# Patient Record
Sex: FEMALE | Race: WHITE
[De-identification: names, ages, dates, MRNs, and addresses within clinical notes are randomized per-mention and may not be internally consistent; named-entity substitution may affect disease eponyms.]

---

## 2019-09-24 ENCOUNTER — HOSPITAL ENCOUNTER (OUTPATIENT)
Dept: HOSPITAL 75 - RAD | Age: 69
End: 2019-09-24
Attending: NURSE PRACTITIONER
Payer: MEDICARE

## 2019-09-24 DIAGNOSIS — Z13.820: ICD-10-CM

## 2019-09-24 DIAGNOSIS — Z12.31: Primary | ICD-10-CM

## 2019-09-24 DIAGNOSIS — Z78.0: ICD-10-CM

## 2019-09-24 DIAGNOSIS — M85.89: ICD-10-CM

## 2019-09-24 PROCEDURE — 77080 DXA BONE DENSITY AXIAL: CPT

## 2019-09-24 PROCEDURE — 77067 SCR MAMMO BI INCL CAD: CPT

## 2019-09-24 NOTE — DIAGNOSTIC IMAGING REPORT
INDICATION: 

Routine screening.



COMPARISON: 

No prior mammograms are available for comparison.



TECHNIQUE: 

2D and 3D bilateral screening mammography was performed with CAD.



FINDINGS:

There are benign calcifications bilaterally. No dominant mass or

malignant appearing microcalcifications are seen. The axillae are

unremarkable.



IMPRESSION:   

No mammographic features suspicious for malignancy are

identified.



ACR BI-RADS Category 2: Benign findings.

Result letter will be mailed to the patient.

Note: At least 10% of breast cancer is not imaged by mammography.



Dictated by: 



  Dictated on workstation # [id number]

## 2019-09-24 NOTE — DIAGNOSTIC IMAGING REPORT
INDICATION: Asymptomatic postmenopausal screening



COMPARISON: Baseline



FINDINGS:



AP Spine L1-L4:  

[BMD (g/cm2): 1.055] [T-Score: -1.2] [Z-Score: -0.3]

[BMD Previous: N/A] [BMD % Change: N/A]



LT Hip Neck:       

[BMD (g/cm2): 0.850] [T-Score: -1.4] [Z-Score: -0.2]



LT Hip Total:       

[BMD (g/cm2):0.967] [T-Score:-0.3] [Z-Score: 0.6]

[BMD Previous: N/A] [BMD % Change: N/A]



RT Hip Neck:      

[BMD (g/cm2):1.000] [T-Score:-0.3] [Z-Score:0.9]



RT Hip Total:      

[BMD (g/cm2):1.060] [T-score:0.4] [Z-Score:1.3]

[BMD Previous:N/A] [BMD % Change:N/A]



*Indicates significant change from prior examination based on 95%

confidence level.



World Health Organization criteria for BMD interpretation

classify patients as Normal (T-score at or above -1.0),

Osteopenic (T-score between -1.0 and -2.5) or Osteoporotic

(T-score at or below -2.5).



LIMITATIONS AND MODIFICATION:  None.



FRACTURE RISK (FRAX SCORE):

The ten year probability of (%): 

Major Osteoporotic Fracture: [N/A]

Hip Fracture: [N/A]



IMPRESSION:

1. Osteopenia (Low bone mass).

2. Baseline examination.

3. See below National Osteoporosis Foundation guidelines on when

to potentially initiate pharmacologic therapy. 



Based on the National Osteoporosis Foundation Guidelines,

pharmacologic treatment should be initiated in any of the

following, unless clinical conditions suggest otherwise:



*  Any patient with prior fragility fracture of the hip or

vertebrae. A spine fracture indicates 5X risk for subsequent

spine fracture and 2X risk for subsequent hip fracture.



*  Osteoporosis (T-score <-2.5).



*  Postmenopausal women and men age 50 and older with low bone

mass/osteopenia (T-score between -1.0 and -2.5) by DXA and

10-year major osteoporotic fracture greater than 20% or a 10-year

probability of hip fracture greater than 3%. These fracture risks

are supplied above in the FRAX score, if applicable.



*  Clinician judgement and/or patient preferences may indicate

treatment for people with 10-year fracture probabilities above or

below these levels.



Dictated by: 



  Dictated on workstation # [id number]

## 2021-03-31 ENCOUNTER — HOSPITAL ENCOUNTER (OUTPATIENT)
Dept: HOSPITAL 75 - RAD | Age: 71
End: 2021-03-31
Attending: NURSE PRACTITIONER
Payer: MEDICARE

## 2021-03-31 DIAGNOSIS — Z12.31: Primary | ICD-10-CM

## 2021-03-31 PROCEDURE — 77063 BREAST TOMOSYNTHESIS BI: CPT

## 2021-03-31 PROCEDURE — 77067 SCR MAMMO BI INCL CAD: CPT

## 2021-04-01 NOTE — DIAGNOSTIC IMAGING REPORT
INDICATION: Routine screening.



Comparison is made with prior mammogram 09/24/2019.



2-D and 3-D bilateral screening mammography was performed with

CAD.



Scattered fibroglandular densities are identified bilaterally.

There are benign calcifications in both breasts. No mass or

malignant appearing microcalcifications are seen. Axillae are

unremarkable.



IMPRESSION: BI-RADS Category 2



No mammographic features suspicious for malignancy are

identified.



ACR BI-RADS Category 2: Benign findings.

Result letter will be mailed to the patient.

Note: At least 10% of breast cancer is not imaged by mammography.



Dictated by: 



  Dictated on workstation # [id number]

## 2021-04-27 ENCOUNTER — HOSPITAL ENCOUNTER (OUTPATIENT)
Dept: HOSPITAL 75 - PREOP | Age: 71
LOS: 1 days | Discharge: HOME | End: 2021-04-28
Attending: SURGERY
Payer: MEDICARE

## 2021-04-27 VITALS — BODY MASS INDEX: 39.06 KG/M2 | WEIGHT: 220.46 LBS | HEIGHT: 62.99 IN

## 2021-04-27 DIAGNOSIS — Z01.818: Primary | ICD-10-CM

## 2021-05-05 ENCOUNTER — HOSPITAL ENCOUNTER (OUTPATIENT)
Dept: HOSPITAL 75 - ENDO | Age: 71
Discharge: HOME | End: 2021-05-05
Attending: SURGERY
Payer: MEDICARE

## 2021-05-05 VITALS — SYSTOLIC BLOOD PRESSURE: 136 MMHG | DIASTOLIC BLOOD PRESSURE: 82 MMHG

## 2021-05-05 VITALS — DIASTOLIC BLOOD PRESSURE: 73 MMHG | SYSTOLIC BLOOD PRESSURE: 140 MMHG

## 2021-05-05 VITALS — SYSTOLIC BLOOD PRESSURE: 166 MMHG | DIASTOLIC BLOOD PRESSURE: 89 MMHG

## 2021-05-05 VITALS — SYSTOLIC BLOOD PRESSURE: 138 MMHG | DIASTOLIC BLOOD PRESSURE: 98 MMHG

## 2021-05-05 VITALS — BODY MASS INDEX: 39.06 KG/M2 | HEIGHT: 62.99 IN | WEIGHT: 220.46 LBS

## 2021-05-05 VITALS — DIASTOLIC BLOOD PRESSURE: 53 MMHG | SYSTOLIC BLOOD PRESSURE: 146 MMHG

## 2021-05-05 VITALS — DIASTOLIC BLOOD PRESSURE: 98 MMHG | SYSTOLIC BLOOD PRESSURE: 138 MMHG

## 2021-05-05 DIAGNOSIS — E78.5: ICD-10-CM

## 2021-05-05 DIAGNOSIS — K57.30: Primary | ICD-10-CM

## 2021-05-05 DIAGNOSIS — J45.909: ICD-10-CM

## 2021-05-05 DIAGNOSIS — Z86.010: ICD-10-CM

## 2021-05-05 DIAGNOSIS — Z82.49: ICD-10-CM

## 2021-05-05 DIAGNOSIS — Z79.899: ICD-10-CM

## 2021-05-05 DIAGNOSIS — Z79.82: ICD-10-CM

## 2021-05-05 DIAGNOSIS — Z79.02: ICD-10-CM

## 2021-05-05 DIAGNOSIS — Z90.710: ICD-10-CM

## 2021-05-05 DIAGNOSIS — Z88.2: ICD-10-CM

## 2021-05-05 DIAGNOSIS — E11.9: ICD-10-CM

## 2021-05-05 DIAGNOSIS — I10: ICD-10-CM

## 2021-05-05 DIAGNOSIS — K21.9: ICD-10-CM

## 2021-05-05 DIAGNOSIS — E66.9: ICD-10-CM

## 2021-05-05 DIAGNOSIS — Z88.0: ICD-10-CM

## 2021-05-05 DIAGNOSIS — Z83.3: ICD-10-CM

## 2021-05-05 PROCEDURE — 82947 ASSAY GLUCOSE BLOOD QUANT: CPT

## 2021-05-05 NOTE — ANESTHESIA-GENERAL POST-OP
MAC


Patient Condition


Mental Status/LOC:  Same as Preop


Cardiovascular:  Satisfactory


Nausea/Vomiting:  Absent


Respiratory:  Satisfactory


Pain:  Controlled


Complications:  Absent





Post Op Complications


Complications


None





Follow Up Care/Instructions


Patient Instructions


None needed.





Anesthesiology Discharge Order


Discharge Order


Patient is doing well, no complaints, stable vital signs, no apparent adverse 

anesthesia problems.   


No complications reported per nursing.











NATALIE OLIVA CRNA            May 5, 2021 08:25

## 2021-05-05 NOTE — PROGRESS NOTE-PRE OPERATIVE
Pre-Operative Progress Note


H&P Reviewed


The H&P was reviewed, patient examined and no changes noted.


Date Seen by Provider:  May 5, 2021


Time Seen by Provider:  07:36


Date H&P Reviewed:  May 5, 2021


Time H&P Reviewed:  07:36


Pre-Operative Diagnosis:  hx of polyps











ANNIKA SUNSHINE DO               May 5, 2021 07:36

## 2021-05-06 NOTE — OPERATIVE REPORT
DATE OF SERVICE:  05/05/2021



PREOPERATIVE DIAGNOSES:

Screening colonoscopy, history of polyps.



POSTOPERATIVE DIAGNOSIS:

Diverticulosis.



PROCEDURE:

Colonoscopy.



SURGEON:

Annika Arango DO



ANESTHESIA:

Per CRNA.



ESTIMATED BLOOD LOSS:

Minimal.



COMPLICATIONS:

None.



INDICATIONS:

The patient is a 71-year-old female with history of polyps.  She understands

risks and benefits of procedure and wished to proceed with procedure.  Consent

was signed in the chart.



DESCRIPTION OF PROCEDURE:

The patient was taken to the endoscopy suite, placed in left lateral recumbent

position.  Timeout was performed.  Digital rectal exam was performed.  No

palpable polyps, masses or ulcerations.  Scope was inserted in the rectum,

advanced all the way to cecum with minimal difficulty.  Prep was adequate. 

Scope was then slowly retracted back.  There were no polyps, masses or

ulcerations within the cecum, ascending, transverse, descending and sigmoid

colon.  Through the sigmoid colon, a moderate amount of diverticulosis was

present.  Scope was then slowly retracted back to the rectum where it was also

retroflexed.  No other pathology noted.  Scope was returned to its normal

position, slowly withdrawn until completely removed.  The patient tolerated

procedure well without any complications.  She was taken to recovery room in

stable condition.



RECOMMENDATIONS:

The patient recommended high-fiber diet.  We would recommend repeat colonoscopy

in 5 years due to history of polyps, though she does not wish to do this due to

age.  We would repeat if has any change in bowels or any new symptoms.





Job ID: [id number]

DocumentID: [id number]

Dictated Date:  05/05/2021 22:15:29

Transcription Date: 05/06/2021 02:43:23

Dictated By: ANNIKA ARANGO DO

## 2022-02-25 ENCOUNTER — HOSPITAL ENCOUNTER (OUTPATIENT)
Dept: HOSPITAL 75 - ER | Age: 72
Setting detail: OBSERVATION
LOS: 3 days | Discharge: HOME | End: 2022-02-28
Attending: FAMILY MEDICINE | Admitting: INTERNAL MEDICINE
Payer: MEDICARE

## 2022-02-25 VITALS — BODY MASS INDEX: 31.76 KG/M2 | HEIGHT: 62.99 IN | WEIGHT: 179.24 LBS

## 2022-02-25 VITALS — DIASTOLIC BLOOD PRESSURE: 60 MMHG | SYSTOLIC BLOOD PRESSURE: 134 MMHG

## 2022-02-25 VITALS — DIASTOLIC BLOOD PRESSURE: 67 MMHG | SYSTOLIC BLOOD PRESSURE: 119 MMHG

## 2022-02-25 VITALS — DIASTOLIC BLOOD PRESSURE: 76 MMHG | SYSTOLIC BLOOD PRESSURE: 121 MMHG

## 2022-02-25 DIAGNOSIS — E11.9: ICD-10-CM

## 2022-02-25 DIAGNOSIS — I95.9: ICD-10-CM

## 2022-02-25 DIAGNOSIS — Z79.899: ICD-10-CM

## 2022-02-25 DIAGNOSIS — G89.29: ICD-10-CM

## 2022-02-25 DIAGNOSIS — D62: ICD-10-CM

## 2022-02-25 DIAGNOSIS — K57.31: Primary | ICD-10-CM

## 2022-02-25 DIAGNOSIS — M54.9: ICD-10-CM

## 2022-02-25 DIAGNOSIS — K52.9: ICD-10-CM

## 2022-02-25 DIAGNOSIS — J45.909: ICD-10-CM

## 2022-02-25 DIAGNOSIS — M25.569: ICD-10-CM

## 2022-02-25 DIAGNOSIS — Z79.82: ICD-10-CM

## 2022-02-25 DIAGNOSIS — M19.90: ICD-10-CM

## 2022-02-25 DIAGNOSIS — Z79.4: ICD-10-CM

## 2022-02-25 LAB
ALBUMIN SERPL-MCNC: 3.7 GM/DL (ref 3.2–4.5)
ALP SERPL-CCNC: 84 U/L (ref 40–136)
ALT SERPL-CCNC: 13 U/L (ref 0–55)
APTT BLD: 24 SEC (ref 24–35)
BASOPHILS # BLD AUTO: 0 10^3/UL (ref 0–0.1)
BASOPHILS NFR BLD AUTO: 0 % (ref 0–10)
BILIRUB SERPL-MCNC: 0.2 MG/DL (ref 0.1–1)
BUN/CREAT SERPL: 25
CALCIUM SERPL-MCNC: 8.9 MG/DL (ref 8.5–10.1)
CHLORIDE SERPL-SCNC: 103 MMOL/L (ref 98–107)
CO2 SERPL-SCNC: 20 MMOL/L (ref 21–32)
CREAT SERPL-MCNC: 0.72 MG/DL (ref 0.6–1.3)
EOSINOPHIL # BLD AUTO: 0.1 10^3/UL (ref 0–0.3)
EOSINOPHIL NFR BLD AUTO: 1 % (ref 0–10)
GFR SERPLBLD BASED ON 1.73 SQ M-ARVRAT: 89 ML/MIN
GLUCOSE SERPL-MCNC: 187 MG/DL (ref 70–105)
HCT VFR BLD CALC: 26 % (ref 35–52)
HCT VFR BLD CALC: 33 % (ref 35–52)
HGB BLD-MCNC: 10.3 G/DL (ref 11.5–16)
HGB BLD-MCNC: 8.2 G/DL (ref 11.5–16)
INR PPP: 0.9 (ref 0.8–1.4)
LYMPHOCYTES # BLD AUTO: 2.9 X 10^3 (ref 1–4)
LYMPHOCYTES NFR BLD AUTO: 26 % (ref 12–44)
MANUAL DIFFERENTIAL PERFORMED BLD QL: NO
MCH RBC QN AUTO: 27 PG (ref 25–34)
MCHC RBC AUTO-ENTMCNC: 32 G/DL (ref 32–36)
MCV RBC AUTO: 84 FL (ref 80–99)
MONOCYTES # BLD AUTO: 0.6 X 10^3 (ref 0–1)
MONOCYTES NFR BLD AUTO: 6 % (ref 0–12)
NEUTROPHILS # BLD AUTO: 7.7 X 10^3 (ref 1.8–7.8)
NEUTROPHILS NFR BLD AUTO: 67 % (ref 42–75)
PLATELET # BLD: 278 10^3/UL (ref 130–400)
PMV BLD AUTO: 10.3 FL (ref 9–12.2)
POTASSIUM SERPL-SCNC: 4.5 MMOL/L (ref 3.6–5)
PROT SERPL-MCNC: 6.6 GM/DL (ref 6.4–8.2)
PROTHROMBIN TIME: 13 SEC (ref 12.2–14.7)
SODIUM SERPL-SCNC: 137 MMOL/L (ref 135–145)
WBC # BLD AUTO: 11.5 10^3/UL (ref 4.3–11)

## 2022-02-25 PROCEDURE — 85730 THROMBOPLASTIN TIME PARTIAL: CPT

## 2022-02-25 PROCEDURE — 74177 CT ABD & PELVIS W/CONTRAST: CPT

## 2022-02-25 PROCEDURE — 86920 COMPATIBILITY TEST SPIN: CPT

## 2022-02-25 PROCEDURE — 86900 BLOOD TYPING SEROLOGIC ABO: CPT

## 2022-02-25 PROCEDURE — 83540 ASSAY OF IRON: CPT

## 2022-02-25 PROCEDURE — 80053 COMPREHEN METABOLIC PANEL: CPT

## 2022-02-25 PROCEDURE — 82947 ASSAY GLUCOSE BLOOD QUANT: CPT

## 2022-02-25 PROCEDURE — 86901 BLOOD TYPING SEROLOGIC RH(D): CPT

## 2022-02-25 PROCEDURE — 86850 RBC ANTIBODY SCREEN: CPT

## 2022-02-25 PROCEDURE — 85014 HEMATOCRIT: CPT

## 2022-02-25 PROCEDURE — 36415 COLL VENOUS BLD VENIPUNCTURE: CPT

## 2022-02-25 PROCEDURE — 85610 PROTHROMBIN TIME: CPT

## 2022-02-25 PROCEDURE — 85025 COMPLETE CBC W/AUTO DIFF WBC: CPT

## 2022-02-25 PROCEDURE — 85018 HEMOGLOBIN: CPT

## 2022-02-25 RX ADMIN — SODIUM CHLORIDE SCH MLS/HR: 900 INJECTION, SOLUTION INTRAVENOUS at 20:16

## 2022-02-25 RX ADMIN — INSULIN ASPART SCH UNIT: 100 INJECTION, SOLUTION INTRAVENOUS; SUBCUTANEOUS at 20:16

## 2022-02-25 RX ADMIN — CIPROFLOXACIN SCH MLS/HR: 2 INJECTION, SOLUTION INTRAVENOUS at 21:51

## 2022-02-25 RX ADMIN — DOCUSATE SODIUM SCH MG: 100 CAPSULE ORAL at 19:54

## 2022-02-25 RX ADMIN — METRONIDAZOLE SCH MLS/HR: 5 INJECTION, SOLUTION INTRAVENOUS at 23:11

## 2022-02-25 RX ADMIN — SENNOSIDES SCH MG: 8.6 TABLET, FILM COATED ORAL at 19:54

## 2022-02-25 NOTE — DIAGNOSTIC IMAGING REPORT
PROCEDURE: CT abdomen and pelvis with contrast.



TECHNIQUE: Multiple contiguous axial images were obtained through

the abdomen and pelvis after administration of intravenous

contrast. Auto Exposure Controls were utilized during the CT exam

to meet ALARA standards for radiation dose reduction. All CT

scans use one or more of the following dose optimizing

techniques: automated exposure control, MA and/or KvP adjustment

based on patient size and exam type or iterative reconstruction.



INDICATION: Left lower quadrant pain. GI bleed. 



COMPARISON: None.



FINDINGS: Lung bases are clear. Nonspecific low-attenuation

regions in the liver likely represent benign cysts or

hemangiomas. Some of these are too small to characterize. The

gallbladder, pancreas, spleen, adrenals, kidneys, collecting

systems, bladder and appendix are negative. There is some

possible wall thickening posteriorly near the rectosigmoid

junction, best seen on axial image 136. Moderate colonic

diverticulosis. No free intraperitoneal air or fluid. No

lymphadenopathy. No evidence of bowel obstruction.

Age-appropriate changes in the spine. No acute osseous findings.



IMPRESSION: 

1. Possible region of bowel wall thickening near the rectosigmoid

junction. This could be better evaluated with direct

visualization/endoscopy.

2. Moderate colonic diverticulosis. No evidence of active

diverticulitis.

3. Several low-attenuation regions scattered throughout the liver

most likely represent benign cysts or hemangiomas but several are

too small to characterize and no priors are available for

comparison.



Dictated by: 



  Dictated on workstation # [id number]

## 2022-02-25 NOTE — ED GI
General


Chief Complaint:  Rect Problems


Stated Complaint:  RECTAL BLEEDING


Nursing Triage Note:  


PT TO RM 7 VIA [facility] W REPORTS OF BLOODY STOOLS. PT ALSO C/O 


NAUSEA AND DIARRHEA, SYMPTOMS BEGAN THIS AM. PT A&OX4, DENIES PAIN.


Source of Information:  Patient


Exam Limitations:  No Limitations





History of Present Illness


Date Seen by Provider:  2022


Time Seen by Provider:  16:19


Initial Comments


To ER by EMS from home with reports of GI bleed.  She has had some bloody stools

"at least 10" today starting at 10 AM.  As the day progressed she has had inc

reasing weakness and lightheadedness.  Her only anticoagulant medication is 

aspirin, no other antiplatelet or anticoagulant medications.  She has some mild 

left lower quadrant abdominal pain and a known history of diverticulosis.  No 

fevers or chills.  She does have some nausea.  She has had a couple of these 

episodes in the past but has never had them evaluated because the bleeding 

seemed to stop on its own.  Most recently she had an episode about 1 year ago.  

She follows with [facility].


Timing/Duration:  4-6 Hours


Severity/Quality:  Moderate


Location:  Generalized Abdomen


Radiation:  No Radiation


Activities at Onset:  None


Associated Symptoms:  Nausea/Vomiting





Allergies and Home Medications


Allergies


Coded Allergies:  


     Penicillins (Unverified  Allergy, Unknown, 12/29/15)


     Sulfa (Sulfonamide Antibiotics) (Unverified  Allergy, Unknown, 12/29/15)





Patient Home Medication List


Home Medication List Reviewed:  Yes


Albuterol Sulfate (Proair Hfa) 8.5 Gm Hfa.aer.ad, 8.5 GM IH AS NEEDED, 

(Reported)


   Entered as Reported by: SELENA ROMO on 10/20/15 1242


Aspirin (Aspir 81) 81 Mg Tablet.dr, 81 MG PO DAILY, (Reported)


   Entered as Reported by: SELENA ROMO on 10/20/15 1242


Buspirone HCl (Buspirone HCl) 10 Mg Tablet, 10 MG PO TID, (Reported)


   Entered as Reported by: SELENA ROMO on 10/20/15 1242


Calcium Carbonate/Vitamin D3 (Calcium 600 + D Tablet) 1 Each Tablet, 2 EACH PO 

DAILY, (Reported)


   Entered as Reported by: SELENA ROMO on 10/20/15 1242


Cranberry Extract (Cranberry) 200 Mg Capsule, 84 MG PO DAILY, (Reported)


   Entered as Reported by: SELENA ROMO on 10/20/15 1242


Fexofenadine HCl (Allegra Allergy) 180 Mg Tablet, 180 MG PO PRN, (Reported)


   Entered as Reported by: SELENA ROMO on 10/20/15 124


Lisinopril (Lisinopril) 5 Mg Tablet, 5 MG PO DAILY, (Reported)


   Entered as Reported by: SELENA ROMO on 10/20/15 124


Magnesium Oxide (Magnesium) 400 Mg Capsule, 400 MG PO BID, (Reported)


   Entered as Reported by: SELENA ROMO on 10/20/15 1248


Omega3/Dha/Epa/Fish Oil/Vit D3 (Fish Oil + Vitamin D-3 Softgel) 1 Each Capsule, 

1 EACH PO DAILY, (Reported)


   Entered as Reported by: SELENA ROMO on 10/20/15 124


Pantoprazole Sodium (Protonix) 40 Mg Granpkt.dr, 40 MG PO BID


   Prescribed by: ANNIKA SUNSHINE on 12/29/15 1114


Potassium Gluconate (Potassium Gluconate) 500 Mg Tablet, 500 MG PO DAILY, 

(Reported)


   Entered as Reported by: SELENA ROMO on 10/20/15 1242


Simvastatin (Simvastatin) 20 Mg Tablet, 20 MG PO DAILY, (Reported)


   Entered as Reported by: SELENA ROMO on 10/20/15 124


Venlafaxine HCl (Effexor Xr) 150 Mg Cap.er.24h, 150 MG PO DAILY, (Reported)


   Entered as Reported by: SELENA ROMO on 10/20/15 124


[Mucinex]  , 2 AS NEEDED, (Reported)


   Entered as Reported by: SELENA ROMO on 10/20/15 1242





Review of Systems


Review of Systems


Constitutional:  see HPI


EENTM:  No Symptoms Reported


Respiratory:  No Symptoms Reported


Cardiovascular:  No Symptoms Reported


Gastrointestinal:  See HPI, Abdominal Pain, Rectal Bleeding


Genitourinary:  No Symptoms Reported


Musculoskeletal:  no symptoms reported


Skin:  no symptoms reported


Psychiatric/Neurological:  No Symptoms Reported


Endocrine:  No Symptoms Reported


Hematologic/Lymphatic:  No Symptoms Reported





Past Medical-Social-Family Hx


Patient Social History


Tobacco Use?:  No


Use of E-Cig and/or Vaping dev:  No


Substance use?:  No


Alcohol Use?:  No





Immunizations Up To Date


Tetanus Booster (TDap):  Unknown


Influenza Vaccine Up-to-Date:  Yes; Up-to-Date


First/Initial COVID19 Vaccinat:  none


Second COVID19 Vaccination Paco:  none


Third COVID19 Vaccination Date:  none


COVID19 Vaccine :  none





Seasonal Allergies


Seasonal Allergies:  No





Past Medical History


Surgery/Hospitalization HX:  


HTN, T2DM


Surgeries:  Yes


Hysterectomy


Respiratory:  Yes (ASTHMA)


Cardiac:  No


Neurological:  No


Genitourinary:  No


Gastrointestinal:  No


Musculoskeletal:  Yes (ARTHRITIS)


Endocrine:  Yes


Diabetes, Non-Insulin dep


HEENT:  No


Loss of Vision:  Bilateral


Hearing Impairment:  Denies


Cancer:  No


Psychosocial:  No


Blood Disorders:  No


Adverse Reaction/Blood Tranf:  No





Physical Exam


Vital Signs





Vital Signs - First Documented








 22





 16:10


 


Temp 35.8


 


Pulse 92


 


Resp 20


 


B/P (MAP) 125/73 (90)


 


Pulse Ox 95


 


O2 Delivery Room Air





Capillary Refill : Less Than 3 Seconds


Height/Weight/BMI


Height: 5'4.00"


Weight: 219lbs. oz. 99.111799wz; 32.00 BMI


Method:


General Appearance:  WD/WN, no apparent distress, other (Alert and oriented 

hemodynamically stable heart rate in the 90s blood pressure of 129/72.)


HEENT:  PERRL/EOMI, normal ENT inspection


Neck:  non-tender, full range of motion


Respiratory:  no respiratory distress, no accessory muscle use


Cardiovascular:  regular rate, rhythm, no murmur


Gastrointestinal:  normal bowel sounds, soft, tenderness


Rectal:  other (  Okay with her back she is right no active bleeding at this 

time though there is some dried blood around the anus.)


Extremities:  normal range of motion, non-tender


Neurologic/Psychiatric:  alert, normal mood/affect, oriented x 3


Skin:  normal color, warm/dry





Progress/Results/Core Measures


Results/Orders


Lab Results





Laboratory Tests








Test


 22


16:15 Range/Units


 


 


White Blood Count


 11.5 H


 4.3-11.0


10^3/uL


 


Red Blood Count


 3.88 


 3.80-5.11


10^6/uL


 


Hemoglobin 10.3 L 11.5-16.0  g/dL


 


Hematocrit 33 L 35-52  %


 


Mean Corpuscular Volume 84  80-99  fL


 


Mean Corpuscular Hemoglobin 27  25-34  pg


 


Mean Corpuscular Hemoglobin


Concent 32 


 32-36  g/dL





 


Red Cell Distribution Width 17.4 H 10.0-14.5  %


 


Platelet Count


 278 


 130-400


10^3/uL


 


Mean Platelet Volume 10.3  9.0-12.2  fL


 


Immature Granulocyte % (Auto) 1   %


 


Neutrophils (%) (Auto) 67  42-75  %


 


Lymphocytes (%) (Auto) 26  12-44  %


 


Monocytes (%) (Auto) 6  0-12  %


 


Eosinophils (%) (Auto) 1  0-10  %


 


Basophils (%) (Auto) 0  0-10  %


 


Neutrophils # (Auto) 7.7  1.8-7.8  X 10^3


 


Lymphocytes # (Auto) 2.9  1.0-4.0  X 10^3


 


Monocytes # (Auto) 0.6  0.0-1.0  X 10^3


 


Eosinophils # (Auto)


 0.1 


 0.0-0.3


10^3/uL


 


Basophils # (Auto)


 0.0 


 0.0-0.1


10^3/uL


 


Immature Granulocyte # (Auto)


 0.1 


 0.0-0.1


10^3/uL


 


Prothrombin Time 13.0  12.2-14.7  SEC


 


INR Comment 0.9  0.8-1.4  


 


Activated Partial


Thromboplast Time 24 


 24-35  SEC





 


Sodium Level 137  135-145  MMOL/L


 


Potassium Level 4.5  3.6-5.0  MMOL/L


 


Chloride Level 103    MMOL/L


 


Carbon Dioxide Level 20 L 21-32  MMOL/L


 


Anion Gap 14  5-14  MMOL/L


 


Blood Urea Nitrogen 18  7-18  MG/DL


 


Creatinine


 0.72 


 0.60-1.30


MG/DL


 


Estimat Glomerular Filtration


Rate 89 


  





 


BUN/Creatinine Ratio 25   


 


Glucose Level 187 H   MG/DL


 


Calcium Level 8.9  8.5-10.1  MG/DL


 


Corrected Calcium 9.1  8.5-10.1  MG/DL


 


Total Bilirubin 0.2  0.1-1.0  MG/DL


 


Aspartate Amino Transf


(AST/SGOT) 15 


 5-34  U/L





 


Alanine Aminotransferase


(ALT/SGPT) 13 


 0-55  U/L





 


Alkaline Phosphatase 84    U/L


 


Total Protein 6.6  6.4-8.2  GM/DL


 


Albumin 3.7  3.2-4.5  GM/DL








My Orders





Orders - ELEAZAR ASHBY APRN


Cbc With Automated Diff (22 16:14)


Comprehensive Metabolic Panel (22 16:14)


Partial Thromboplastin Time (22 16:14)


Protime With Inr (22 16:14)


Type And Screen (22 16:14)


Red Cells Leukocytes Reduced (22 16:14)


Ed Iv/Invasive Line Start (22 16:14)


Ct Abdomen/Pelvis W (22 16:14)


Iohexol Injection (Omnipaque 350 Mg/Ml 1 (22 17:00)


Received Contrast (Hold Metformin- Contr (22 17:00)


Ns (Ivpb) (Sodium Chloride 0.9% Ivpb Bag (22 17:00)


Lactated Ringers (Lr 1000 Ml Iv Solution (22 17:45)





Medications Given in ED





Current Medications








 Medications  Dose


 Ordered  Sig/Sabrina


 Route  Start Time


 Stop Time Status Last Admin


Dose Admin


 


 Iohexol  100 ml  ONCE  ONCE


 IV  22 17:00


 22 17:01 DC 22 17:10


100 ML


 


 Sodium Chloride  100 ml  ONCE  ONCE


 IV  22 17:00


 22 17:01 DC 22 17:11


80 ML








Vital Signs/I&O











 22





 16:10


 


Temp 35.8


 


Pulse 92


 


Resp 20


 


B/P (MAP) 125/73 (90)


 


Pulse Ox 95


 


O2 Delivery Room Air














Blood Pressure Mean:                    90











Departure


Communication (Admissions)


0829-she lives at home in an apartment complex in [place] alone with her dog.  

She has not had any bloody stools here.  She is hemodynamically stable with a 

heart rate of 98 blood pressure 123/77.  She is alert and oriented.  Given her 

age and potential for rebleeding I think it would be best to observe her 

overnight with a repeat hemoglobin in the morning.  We will do Cipro and Flagyl 

empirically.  I will admit to Dr. Gtz consult Dr. Sunshine.


NAME:   ABIGAIL HOPKINS


MED REC#:   [id number]


ACCOUNT#:   [id number]


PT STATUS:   REG ER


:   1950


PHYSICIAN:   ELEAZAR ASHBY


ADMIT DATE:   22/ER


                                   ***Draft***


Date of Exam:22





CT ABDOMEN/PELVIS W








PROCEDURE: CT abdomen and pelvis with contrast.





TECHNIQUE: Multiple contiguous axial images were obtained through


the abdomen and pelvis after administration of intravenous


contrast. Auto Exposure Controls were utilized during the CT exam


to meet ALARA standards for radiation dose reduction. All CT


scans use one or more of the following dose optimizing


techniques: automated exposure control, MA and/or KvP adjustment


based on patient size and exam type or iterative reconstruction.





INDICATION: Left lower quadrant pain. GI bleed. 





COMPARISON: None.





FINDINGS: Lung bases are clear. Nonspecific low-attenuation


regions in the liver likely represent benign cysts or


hemangiomas. Some of these are too small to characterize. The


gallbladder, pancreas, spleen, adrenals, kidneys, collecting


systems, bladder and appendix are negative. There is some


possible wall thickening posteriorly near the rectosigmoid


junction, best seen on axial image 136. Moderate colonic


diverticulosis. No free intraperitoneal air or fluid. No


lymphadenopathy. No evidence of bowel obstruction.


Age-appropriate changes in the spine. No acute osseous findings.





IMPRESSION: 


1. Possible region of bowel wall thickening near the rectosigmoid


junction. This could be better evaluated with direct


visualization/endoscopy.


2. Moderate colonic diverticulosis. No evidence of active


diverticulitis.


3. Several low-attenuation regions scattered throughout the liver


most likely represent benign cysts or hemangiomas but several are


too small to characterize and no priors are available for


comparison.





  Dictated on workstation # [id number]








Dict:   [id number]


Trans:   [id number]


[facility] [id number]





Interpreted by:     ADELA OREILLY MD


Electronically signed by:





Impression





   Primary Impression:  


   GI bleed


Disposition:   ADMITTED AS INPATIENT


Condition:  Stable





Admissions


Decision to Admit Reason:  Admit from ER (General)


Decision to Admit/Date:  2022


Time/Decision to Admit Time:  17:56





Departure-Patient Inst.


Referrals:  


[facility] (PCP/Family)


Primary Care Physician











ELEAZAR ASHBY             2022 16:21

## 2022-02-26 VITALS — DIASTOLIC BLOOD PRESSURE: 57 MMHG | SYSTOLIC BLOOD PRESSURE: 107 MMHG

## 2022-02-26 VITALS — SYSTOLIC BLOOD PRESSURE: 139 MMHG | DIASTOLIC BLOOD PRESSURE: 65 MMHG

## 2022-02-26 VITALS — SYSTOLIC BLOOD PRESSURE: 130 MMHG | DIASTOLIC BLOOD PRESSURE: 75 MMHG

## 2022-02-26 VITALS — SYSTOLIC BLOOD PRESSURE: 142 MMHG | DIASTOLIC BLOOD PRESSURE: 67 MMHG

## 2022-02-26 VITALS — DIASTOLIC BLOOD PRESSURE: 67 MMHG | SYSTOLIC BLOOD PRESSURE: 121 MMHG

## 2022-02-26 VITALS — SYSTOLIC BLOOD PRESSURE: 134 MMHG | DIASTOLIC BLOOD PRESSURE: 67 MMHG

## 2022-02-26 LAB
ALBUMIN SERPL-MCNC: 3.2 GM/DL (ref 3.2–4.5)
ALP SERPL-CCNC: 67 U/L (ref 40–136)
ALT SERPL-CCNC: 11 U/L (ref 0–55)
BASOPHILS # BLD AUTO: 0 10^3/UL (ref 0–0.1)
BASOPHILS NFR BLD AUTO: 1 % (ref 0–10)
BILIRUB SERPL-MCNC: 0.2 MG/DL (ref 0.1–1)
BUN/CREAT SERPL: 22
CALCIUM SERPL-MCNC: 8.1 MG/DL (ref 8.5–10.1)
CHLORIDE SERPL-SCNC: 107 MMOL/L (ref 98–107)
CO2 SERPL-SCNC: 22 MMOL/L (ref 21–32)
CREAT SERPL-MCNC: 0.58 MG/DL (ref 0.6–1.3)
EOSINOPHIL # BLD AUTO: 0.1 10^3/UL (ref 0–0.3)
EOSINOPHIL NFR BLD AUTO: 2 % (ref 0–10)
GFR SERPLBLD BASED ON 1.73 SQ M-ARVRAT: 97 ML/MIN
GLUCOSE SERPL-MCNC: 119 MG/DL (ref 70–105)
HCT VFR BLD CALC: 26 % (ref 35–52)
HGB BLD-MCNC: 8.3 G/DL (ref 11.5–16)
LYMPHOCYTES # BLD AUTO: 2.3 10^3/UL (ref 1–4)
LYMPHOCYTES NFR BLD AUTO: 35 % (ref 12–44)
MANUAL DIFFERENTIAL PERFORMED BLD QL: NO
MCH RBC QN AUTO: 26 PG (ref 25–34)
MCHC RBC AUTO-ENTMCNC: 31 G/DL (ref 32–36)
MCV RBC AUTO: 83 FL (ref 80–99)
MONOCYTES # BLD AUTO: 0.6 10^3/UL (ref 0–1)
MONOCYTES NFR BLD AUTO: 9 % (ref 0–12)
NEUTROPHILS # BLD AUTO: 3.4 10^3/UL (ref 1.8–7.8)
NEUTROPHILS NFR BLD AUTO: 53 % (ref 42–75)
PLATELET # BLD: 214 10^3/UL (ref 130–400)
PMV BLD AUTO: 9.8 FL (ref 9–12.2)
POTASSIUM SERPL-SCNC: 4.1 MMOL/L (ref 3.6–5)
PROT SERPL-MCNC: 5.5 GM/DL (ref 6.4–8.2)
SODIUM SERPL-SCNC: 137 MMOL/L (ref 135–145)
WBC # BLD AUTO: 6.4 10^3/UL (ref 4.3–11)

## 2022-02-26 RX ADMIN — SODIUM CHLORIDE SCH MLS/HR: 900 INJECTION, SOLUTION INTRAVENOUS at 16:22

## 2022-02-26 RX ADMIN — PANTOPRAZOLE SODIUM SCH MG: 40 TABLET, DELAYED RELEASE ORAL at 08:07

## 2022-02-26 RX ADMIN — INSULIN ASPART SCH UNIT: 100 INJECTION, SOLUTION INTRAVENOUS; SUBCUTANEOUS at 20:19

## 2022-02-26 RX ADMIN — METRONIDAZOLE SCH MLS/HR: 5 INJECTION, SOLUTION INTRAVENOUS at 21:14

## 2022-02-26 RX ADMIN — SODIUM CHLORIDE SCH MLS/HR: 900 INJECTION, SOLUTION INTRAVENOUS at 08:07

## 2022-02-26 RX ADMIN — METRONIDAZOLE SCH MLS/HR: 5 INJECTION, SOLUTION INTRAVENOUS at 10:05

## 2022-02-26 RX ADMIN — DOCUSATE SODIUM SCH MG: 100 CAPSULE ORAL at 08:07

## 2022-02-26 RX ADMIN — INSULIN ASPART SCH UNIT: 100 INJECTION, SOLUTION INTRAVENOUS; SUBCUTANEOUS at 10:27

## 2022-02-26 RX ADMIN — CIPROFLOXACIN SCH MLS/HR: 2 INJECTION, SOLUTION INTRAVENOUS at 19:44

## 2022-02-26 RX ADMIN — SENNOSIDES SCH MG: 8.6 TABLET, FILM COATED ORAL at 08:07

## 2022-02-26 RX ADMIN — CIPROFLOXACIN SCH MLS/HR: 2 INJECTION, SOLUTION INTRAVENOUS at 08:07

## 2022-02-26 RX ADMIN — INSULIN ASPART SCH UNIT: 100 INJECTION, SOLUTION INTRAVENOUS; SUBCUTANEOUS at 16:22

## 2022-02-26 RX ADMIN — SENNOSIDES SCH MG: 8.6 TABLET, FILM COATED ORAL at 19:44

## 2022-02-26 RX ADMIN — INSULIN ASPART SCH UNIT: 100 INJECTION, SOLUTION INTRAVENOUS; SUBCUTANEOUS at 04:57

## 2022-02-26 RX ADMIN — DOCUSATE SODIUM SCH MG: 100 CAPSULE ORAL at 19:43

## 2022-02-26 NOTE — HISTORY & PHYSICAL-HOSPITALIST
History of Present Illness


HPI/Chief Complaint


Chief complaint: Hematochezia





History of present illness: This is a 71-year-old white female clinic patient of

[facility] who presented to the ER after feeling dizzy and passing bright red blood per

rectum.  Patient was assessed to have hemoglobin of 10 and CT scan showed 

colitis.  Currently hemoglobin is 8.3.  She will need colonoscopy as an 

outpatient.  IV antibiotics maintained treatment for colitis.  Patient has not 

passed another stool today.


Source:  patient


Exam Limitations:  no limitations


Date Seen


2/26/22


Time Seen by a Provider:  10:30


Attending Physician


Kaela Gtz DO


[facility]


Center/Se,[facility]


Referring Physician





Date of Admission


Feb 25, 2022 at 18:02





Home Medications & Allergies


Home Medications


Reviewed patient Home Medication Reconciliation performed by pharmacy medication

reconciliations technician and/or nursing.


Patients Allergies have been reviewed.





Allergies





Allergies


Coded Allergies


  Penicillins (Unverified Allergy, Unknown, 12/29/15)


  Sulfa (Sulfonamide Antibiotics) (Unverified Allergy, Unknown, 12/29/15)








Past Medical-Social-Family Hx


Patient Social History


Marrital Status:  single


Employed/Student:  retired


Tobacco Use?:  No


Smoking Status:  Never a Smoker


Use of E-Cig and/or Vaping dev:  No


Substance use?:  No


Alcohol Use?:  No


Pt feels they are or have been:  No





Immunizations Up To Date


Date of Influenza Vaccine:  Oct 1, 2021


First/Initial COVID19 Vaccinat:  OCT 2021


Second COVID19 Vaccination Paco:  none


Tetanus Booster (TDap):  Unknown


Date of Pneumonia Vaccine:  Feb 20, 2010





Seasonal Allergies


Seasonal Allergies:  No





Current Status


Pregnancy status:  No


Breastfeeding status:  No


Advance Directives:  No


Communicates:  Verbally


Primary Language:  English


Preferred Spoken Language:  English


Is interpretation needed?:  No


Sensory deficits:  Vision impairment


Implanted or Applied Medical D:  None





Past Medical History


Surgeries:  Hysterectomy


Diabetes, Non-Insulin dep


Loss of Vision:  Bilateral


Hearing Impairment:  Denies


Blood Disorders:  No


Adverse Reaction/Blood Tranf:  No





Review of Systems


Constitutional:  see HPI, dizziness, malaise, weakness


EENTM:  no symptoms reported


Respiratory:  no symptoms reported


Cardiovascular:  no symptoms reported


Gastrointestinal:  melena


Musculoskeletal:  see HPI


Skin:  no symptoms reported


Psychiatric/Neurological:  No Symptoms Reported


All Other Systems Reviewed


Negative Unless Noted:  Yes





Physical Exam


Physical Exam


Vital Signs





Vital Signs - First Documented








 2/25/22





 16:10


 


Temp 35.8


 


Pulse 92


 


Resp 20


 


B/P (MAP) 125/73 (90)


 


Pulse Ox 95


 


O2 Delivery Room Air





Capillary Refill : Less Than 3 Seconds


Height, Weight, BMI


Height: 5'4.00"


Weight: 219lbs. oz. 99.884297wv; 31.75 BMI


Method:


General Appearance:  No Apparent Distress, Chronically ill


Eyes:  Right Eye Normal Inspection, Right Eye PERRL


HEENT:  PERRL/EOMI, Normal ENT Inspection, Pharynx Normal, Moist Mucous 

Membranes


Neck:  Full Range of Motion, Normal Inspection, Non Tender


Respiratory:  Chest Non Tender, Lungs Clear, Normal Breath Sounds, No Accessory 

Muscle Use, No Respiratory Distress


Cardiovascular:  Regular Rate, Rhythm, No Edema, No Gallop, No JVD, No Murmur, 

Normal Peripheral Pulses


Gastrointestinal:  Normal Bowel Sounds, No Organomegaly, No Pulsatile Mass, Non 

Tender, Soft


Back:  Normal Inspection, No CVA Tenderness, No Vertebral Tenderness


Extremity:  Normal Capillary Refill, Normal Inspection, Normal Range of Motion, 

Non Tender, No Calf Tenderness, No Pedal Edema


Neurologic/Psychiatric:  Alert, Oriented x3, No Motor/Sensory Deficits, Normal 

Mood/Affect


Skin:  Normal Color, Warm/Dry


Lymphatic:  No Adenopathy





Results


Results/Procedures


Labs


Laboratory Tests


2/25/22 16:15








2/25/22 22:55








2/26/22 06:00








2/27/22 05:34








Patient resulted labs reviewed.





Assessment/Plan


Admission Diagnosis


Assessment:


Hematochezia


Acute blood loss anemia hemoglobin 8.3 today


Symptomatic anemia


Acute colitis





Plan:


Monitor hemoglobin


Protonix


Dr. Arango consult


IV antibiotics empirically


Admission Status:  Observation





Diagnosis/Problems


Diagnosis/Problems





(1) GI bleed


Status:  Acute


(2) Diverticulitis





Clinical Quality Measures


DVT/VTE Risk/Contraindication:


Contraindications-Pharm:  Other *list below*


Other:  


KAELA Henry DO                Feb 26, 2022 07:32

## 2022-02-26 NOTE — PHYSICAL THERAPY EVALUATION
PT Evaluation-General


Medical Diagnosis


Admission Date


Feb 25, 2022 at 18:02


Medical Diagnosis:  GI bleed


Onset Date:  Feb 25, 2022





Therapy Diagnosis


Therapy Diagnosis:  debility/weakness





Height/Weight


Height (Feet):  5


Height (Inches):  4.00


Weight (Pounds):  219





Precautions


Precautions/Isolations:  Fall Prevention, Standard Precautions





Weight Bear Status


Right Lower Extremity:  Right


Full Weight Bearing


Left Lower Extremity:  Left


Full Weight Bearing





Referral


Physician:  Ulisses


Reason for Referral:  Evaluation/Treatment





Medical History


Pertinent Medical History:  DM, HTN


Current History


EMS secondary to bloody stools with increase weakness and lightheadedness


Reviewed History:  Yes





Social History


Home:  Apartment


Current Living Status:  Alone


Entry Into Home:  Level Entry





Prior


Prior Level of Function


SCALE: Activities may be completed with or without assistive devices.





6-Indepedent-patient completes the activity by him/herself with no assistance 

from a helper.


5-Set-up or Clean-up Assistance-helper sets up or cleans up; patient completes 

activity. Kenneth assists only prior to or  


    following the activity.


4-Supervision or Touching Assistance-helper provides verbal cues and/or 

touching/steadying and/or contact guard assistance as patient completes 

activity. Assistance may be provided   


    throughout the activity or intermittently.


3-Partial/Moderate Assistance-helper does LESS THAN HALF the effort. Kenneth 

lifts, holds or supports trunk or limbs, but provides less than half the effort.


2-Substantial/Maximal Assistance-helper does MORE THAN HALF the effort. Kenneth 

lifts or holds trunk or limbs and provides more than half the effort.


5-Vwtukhrqe-uutail does ALL the effort. Patient does none of the effort to 

complete the activity. Or, the assistance of 2 or more helpers is required for 

the patient to complete the  


    activity.


If activity was not attempted, code reason:


7-Patient Refused.


9-Not Applicable-not attempted and the patient did not perform the activity 

before the current illness, exacerbation or injury.


10-Not Attempted due to Environmental Limitations-(lack of equipment, weather 

restraints, etc.).


88-Not Attempted due to Medical Conditions or Safety Concerns.


Bed Mobility:  6


Transfers (B,C,W/C):  6


Gait:  6


Indoor Mobility (Ambulation):  Independent


Prior Devices Use:  Walker





PT Evaluation-Current


Subjective


Patient agrees to PT.  She reports she is feeling better today.





Objective


Patient Orientation:  Normal For Age


Attachments:  IV





ROM/Strength


ROM Lower Extremities


bilateral LE WFL


Strength Lower Extremities


4/5 grossly bilateral LE





Integumentary/Posture


Bowel Incontinence:  No


Bladder Incontinence:  No


Posture


WFL





Neuromuscular


(Tone, Coordination, Reflexes)


grossly intact





Sensory


Vision:  Wears Glasses


Hearing:  Functional





Transfers


Sit to Stand (QC):  4 (SBA)





Gait


Does the Patient Walk?:  Yes


Mode of Locomotion:  Walk


Anticipated Mode of Locomotion:  Walk


Walk 10 feet (QC):  4


Walk 50 ft with 2 Turns(QC):  4


Walk 150 ft (QC):  4


Distance:  250'


Gait Assistive Device:  FWW


Comments/Gait Description


safe and functional with no deviation/SBA





Balance


Sitting Static:  Normal


Sitting Dynamic:  Normal


Standing Static:  Normal


 Standing Dynamic:  Normal





Assessment/Needs


71 y.o. female, will be seen short term by skilled PT to address functional 

mobility to ensure safe return to home at maximum LOF.  Nursing staff instructed

to ambulate with patient PRN.


Rehab Potential:  Fair





PT Long Term Goals


Long Term Goals


PT Long Term Goals Time Frame:  Mar 5, 2022


Roll Left & Right (QC):  6


Sit to Lying (QC):  6


Lying-Sitting on Side/Bed(QC):  6


Sit to Stand (QC):  6


Chair/Bed-to-Chair Xfer(QC):  6


Toilet Transfer (QC):  6


Walk 10 feet (QC):  6


Walk 50ft with 2 Turns (QC):  6


Walk 150 ft (QC):  6





PT Plan


Treatment/Plan


Treatment Plan:  Continue Plan of Care


Treatment Plan:  Education, Functional Activity Blanca, Functional Strength, 

Gait, Safety, Therapeutic Exercise, Transfers


Treatment Duration:  Mar 5, 2022


Frequency:  6 times per week


Estimated Hrs Per Day:  .25 hour per day


Patient and/or Family Agrees t:  Yes





Time/GCodes


Time In:  850


Time Out:  900


Total Billed Treatment Time:  10


Total Billed Treatment


1 visit


EVModC 10 min











ROLAND OJEDA PT              Feb 26, 2022 09:43

## 2022-02-26 NOTE — CONSULTATION - SURGERY
DALTON COOPER 2/26/22 0817:


History of Present Illness


History of Present Illness


Patient Consulted On(kaylan/time)


2/26/22


 08:16


Date Seen by Provider:  Feb 26, 2022


Time Seen by Provider:  08:25


Reason for Visit:  CC: Rectal Bleeding


History of Present Illness


Consult requested by Dr. Gtz for GI bleeding. 70 yo female with hx of 

diverticulosis, HTN, and non-insulin dependent diabetes presented to the 

[place] [facility] via EMS yesterday afternoon with rectal bleeding. Pt states she 

first noticed blood in her stool around 10am yesterday. The blood was dark, but 

may have had some bright red blood in the stool as well. She does not know how 

much blood was in her 5-6 loose stools, but states it was "a lot." Diarrhea is 

not new to the pt, most of her stools are loose. The pt felt weak prior to 

coming in, with knees that "felt like rubber." Her vision also went blurry, but 

she did not fall. Pt denies being in pain, just stating she felt "discomfort" in

her lower abdomen. The pt has had similar bleeding in the past (as recent as one

year ago), but this is the worst it has ever been. She states her last 

colonoscopy was 1 or 2 years ago, and just showed diverticulosis. CT of 

abdomen/pelvis showed bowel thickening in the rectosigmoid and diverticulosis, 

as well as hepatic cysts.





Pt is tired. She has not had a bowel movement today. Reports no blood when she 

wipes. Stool softeners were given this a.m. She has slight epigastric and lower 

quadrant abdominal "discomfort," but states she is not in pain. Her vision has 

returned to normal. Pt is on a CLD, and last ate solid food early yesterday 

morning. Pt denies N/V, CP, SOB, fever, and chills at this time.





Allergies and Home Medications


Allergies


Coded Allergies:  


     Penicillins (Unverified  Allergy, Unknown, 12/29/15)


     Sulfa (Sulfonamide Antibiotics) (Unverified  Allergy, Unknown, 12/29/15)





Patient Home Medication List


Aspirin (Aspir 81) 81 Mg Tablet., 81 MG PO DAILY, (Reported)


   Entered as Reported by: SELENA ROMO on 10/20/15 3172


   Last Action: Reviewed


Buspirone HCl (Buspirone HCl) 10 Mg Tablet, 10 MG PO BID, (Reported)


   Entered as Reported by: SELENA ROMO on 10/20/15 1242


   Last Action: Reviewed


Calcium Carbonate/Vitamin D3 (Calcium 600 + D Tablet) 1 Each Tablet, 2 EACH PO 

DAILY, (Reported)


   Entered as Reported by: SELENA ROMO on 10/20/15 1242


   Last Action: Reviewed


Cranberry Extract (Cranberry) 200 Mg Capsule, 84 MG PO DAILY, (Reported)


   Entered as Reported by: SELENA ROMO on 10/20/15 1242


   Last Action: Reviewed


Fexofenadine HCl (Allegra Allergy) 180 Mg Tablet, 180 MG PO PRN, (Reported)


   Entered as Reported by: SELENA ROMO on 10/20/15 1242


   Last Action: Reviewed


Lisinopril (Lisinopril) 5 Mg Tablet, 5 MG PO DAILY, (Reported)


   Entered as Reported by: SELENA ROMO on 10/20/15 1242


   Last Action: Reviewed


Magnesium Oxide (Magnesium) 400 Mg Capsule, 400 MG PO BID, (Reported)


   Entered as Reported by: SELENA ROMO on 10/20/15 1248


   Last Action: Reviewed


Omega3/Dha/Epa/Fish Oil/Vit D3 (Fish Oil + Vitamin D-3 Softgel) 1 Each Capsule, 

1 EACH PO DAILY, (Reported)


   Entered as Reported by: SELENA ROMO on 10/20/15 1242


   Last Action: Reviewed


Pantoprazole Sodium (Protonix) 40 Mg Granpkt.dr, 40 MG PO BID


   Prescribed by: ANNIKA SUNSHINE on 12/29/15 1114


Potassium Gluconate (Potassium Gluconate) 500 Mg Tablet, 500 MG PO DAILY, 

(Reported)


   Entered as Reported by: SELENA ROMO on 10/20/15 1242


   Last Action: Reviewed


Simvastatin (Simvastatin) 20 Mg Tablet, 20 MG PO DAILY, (Reported)


   Entered as Reported by: SELENA ROMO on 10/20/15 1242


   Last Action: Reviewed


Venlafaxine HCl (Effexor Xr) 150 Mg Cap.er.24h, 150 MG PO DAILY, (Reported)


   Entered as Reported by: SELENA ROMO on 10/20/15 1242


   Last Action: Reviewed


[Mucinex]  , 2 AS NEEDED, (Reported)


   Entered as Reported by: SELENA ROMO on 10/20/15 1242


   Last Action: Reviewed


Discontinued Medications


Albuterol Sulfate (Proair Hfa) 8.5 Gm Hfa.aer.ad, 8.5 GM IH AS NEEDED, 

(Reported)


   Discontinued Reason: No Longer Taking


   Entered as Reported by: SELENA ROMO on 10/20/15 1242


   Last Action: Discontinued





Past Medical-Social-Family Hx


Patient Social History


2nd Hand Smoke Exposure:  No


Recent Hopitalizations:  No


Alcohol Use?:  No


Have you traveled recently?:  No





Immunizations Up To Date


Tetanus Booster (TDap):  Unknown


Date of Pneumonia Vaccine:  Feb 20, 2010


Date of Influenza Vaccine:  Oct 1, 2021





Seasonal Allergies


Seasonal Allergies:  No





Surgeries


History of Surgeries:  Yes


Surgeries:  Hysterectomy





Respiratory


History of Respiratory Disorde:  Yes (ASTHMA)


Respiratory Disorders:  Asthma





Cardiovascular


History of Cardiac Disorders:  No





Neurological


History of Neurological Disord:  No





Genitourinary


History of Genitourinary Disor:  No





Gastrointestinal


History of Gastrointestinal Di:  No





Musculoskeletal


History of Musculoskeletal Dis:  Yes (ARTHRITIS)


Musculoskeletal Disorders:  Chronic Back Pain





Endocrine


History of Endocrine Disorders:  Yes


Endocrine Disorders:  Diabetes, Non-Insulin dep





HEENT


History of HEENT Disorders:  No


Loss of Vision:  Bilateral


Hearing Impairment:  Denies





Cancer


History of Cancer:  No





Psychosocial


History of Psychiatric Problem:  No





Blood Transfusions


History of Blood Disorders:  No


Adverse Reaction to a Blood Tr:  No





Family Medical History


Significant Family History:  Heart Disease (father), Cancer (brother pancreatic)





Review of Systems-General


Constitutional:  No chills, No fever


EENTM:  No hearing loss, No blurred vision


Respiratory:  No cough, No short of breath


Cardiovascular:  No chest pain, No palpitations


Gastrointestinal:  No abdominal pain; diarrhea; No nausea, No vomiting; other 

(reports "discomfort" not pain)


Genitourinary:  No dysuria; frequency


Musculoskeletal:  back pain, joint pain (knee needs replaced), neck pain


Skin:  No lesions, No rash


Psychiatric/Neurological:  Anxiety; Denies Headache





Physical Exam-General Problems


Physical Exam


Vital Signs





Vital Signs - First Documented








 2/25/22





 16:10


 


Temp 35.8


 


Pulse 92


 


Resp 20


 


B/P (MAP) 125/73 (90)


 


Pulse Ox 95


 


O2 Delivery Room Air





Capillary Refill : Less Than 3 Seconds


General Appearance:  WD/WN, no apparent distress


Eyes:  Bilateral Eye Normal Inspection


Neck:  normal inspection, tender lateral (left side)


Respiratory:  lungs clear, normal breath sounds, no respiratory distress


Cardiovascular:  regular rate, rhythm, no murmur


Peripheral Pulses:  2+ Dorsalis Pedis (R), 2+ Left Dors-Pedis (L), 2+ Radial 

Pulses (R), 2+ Radial Pulses (L)


Gastrointestinal:  soft, no organomegaly, tenderness (lower quadrants, epigastri

c)


Extremities:  normal inspection, no pedal edema


Neurologic/Psychiatric:  alert, normal mood/affect, oriented x 3


Skin:  normal color, warm/dry





Data Review


Labs


Laboratory Tests


2/25/22 16:15: 


White Blood Count 11.5H, Red Blood Count 3.88, Hemoglobin 10.3L, Hematocrit 33L,

 Mean Corpuscular Volume 84, Mean Corpuscular Hemoglobin 27, Mean Corpuscular 

Hemoglobin Concent 32, Red Cell Distribution Width 17.4H, Platelet Count 278, 

Mean Platelet Volume 10.3, Immature Granulocyte % (Auto) 1, Neutrophils (%) 

(Auto) 67, Lymphocytes (%) (Auto) 26, Monocytes (%) (Auto) 6, Eosinophils (%) 

(Auto) 1, Basophils (%) (Auto) 0, Neutrophils # (Auto) 7.7, Lymphocytes # (Auto)

 2.9, Monocytes # (Auto) 0.6, Eosinophils # (Auto) 0.1, Basophils # (Auto) 0.0, 

Immature Granulocyte # (Auto) 0.1, Prothrombin Time 13.0, INR Comment 0.9, 

Activated Partial Thromboplast Time 24, Sodium Level 137, Potassium Level 4.5, 

Chloride Level 103, Carbon Dioxide Level 20L, Anion Gap 14, Blood Urea Nitrogen 

18, Creatinine 0.72, Estimat Glomerular Filtration Rate 89, BUN/Creatinine Ratio

 25, Glucose Level 187H, Calcium Level 8.9, Corrected Calcium 9.1, Total 

Bilirubin 0.2, Aspartate Amino Transf (AST/SGOT) 15, Alanine Aminotransferase 

(ALT/SGPT) 13, Alkaline Phosphatase 84, Total Protein 6.6, Albumin 3.7


2/25/22 20:03: Glucometer 105


2/25/22 22:55: 


Hemoglobin 8.2L, Hematocrit 26L


2/26/22 04:56: Glucometer 118H


2/26/22 06:00: 


White Blood Count 6.4, Red Blood Count 3.17L, Hemoglobin 8.3L, Hematocrit 26L, 

Mean Corpuscular Volume 83, Mean Corpuscular Hemoglobin 26, Mean Corpuscular 

Hemoglobin Concent 31L, Red Cell Distribution Width 17.1H, Platelet Count 214, 

Mean Platelet Volume 9.8, Immature Granulocyte % (Auto) 1, Neutrophils (%) 

(Auto) 53, Lymphocytes (%) (Auto) 35, Monocytes (%) (Auto) 9, Eosinophils (%) 

(Auto) 2, Basophils (%) (Auto) 1, Neutrophils # (Auto) 3.4, Lymphocytes # (Auto)

 2.3, Monocytes # (Auto) 0.6, Eosinophils # (Auto) 0.1, Basophils # (Auto) 0.0, 

Immature Granulocyte # (Auto) 0.0, Sodium Level 137, Potassium Level 4.1, 

Chloride Level 107, Carbon Dioxide Level 22, Anion Gap 8, Blood Urea Nitrogen 

13, Creatinine 0.58L, Estimat Glomerular Filtration Rate 97, BUN/Creatinine 

Ratio 22, Glucose Level 119H, Calcium Level 8.1L, Corrected Calcium 8.7, Total 

Bilirubin 0.2, Aspartate Amino Transf (AST/SGOT) 15, Alanine Aminotransferase 

(ALT/SGPT) 11, Alkaline Phosphatase 67, Total Protein 5.5L, Albumin 3.2





Assessment/Plan


GI bleeding


Hypotension (usually HTN, currently 107/57)


non-insulin dependent DM


asthma


arthritis


diverticulosis, rectosigmoid thickening on CT


chronic knee and back pain





Continue Cipro and metronidazole


Continue CLD


Continue SS insulin


Hgb stable


Monitor Labs and Vitals


Consider EGD/colonoscopy if bleeding continues





Clinical Quality Measures


DVT/VTE Risk/Contraindication:


Contraindications-Pharm:  Other *list below*


Other:  


ANNIKA Mederos DO 2/26/22 1337:


History of Present Illness


Consult requested for GI bleeding.  Patient is a 71-year-old female who 

presented emergency department for rectal bleeding.  She has had bleeding on and

 off for quite some time she states.  She states that this time though it seemed

 to be more bright red blood.  She had some slight abdominal discomfort in the 

lower abdomen.  Nothing really made it better or worse.  Patient states that the

 pain does not radiate anywhere.  Patient states that she always has some 

diarrhea though.  This has not really gone away.  Patient feels just little bit 

more weak.  She has no other complaints at this time.  She had a CT scan which 

demonstrated some rectosigmoid thickening possibly and diverticulosis and some 

small hepatic cysts likely.





Allergies and Home Medications


Allergies


Coded Allergies:  


     Penicillins (Unverified  Allergy, Unknown, 12/29/15)


     Sulfa (Sulfonamide Antibiotics) (Unverified  Allergy, Unknown, 12/29/15)





Patient Home Medication List


Home Medication List Reviewed:  Yes


Aspirin (Aspir 81) 81 Mg Tablet.dr, 81 MG PO DAILY, (Reported)


   Entered as Reported by: SELENA ROMO on 10/20/15 1242


   Last Action: Reviewed


Buspirone HCl (Buspirone HCl) 10 Mg Tablet, 10 MG PO BID, (Reported)


   Entered as Reported by: SELENA ROMO on 10/20/15 1242


   Last Action: Reviewed


Calcium Carbonate/Vitamin D3 (Calcium 600 + D Tablet) 1 Each Tablet, 2 EACH PO 

DAILY, (Reported)


   Entered as Reported by: SELENA ROMO on 10/20/15 1242


   Last Action: Reviewed


Cranberry Extract (Cranberry) 200 Mg Capsule, 84 MG PO DAILY, (Reported)


   Entered as Reported by: SELENA ROMO on 10/20/15 1242


   Last Action: Reviewed


Fexofenadine HCl (Allegra Allergy) 180 Mg Tablet, 180 MG PO PRN, (Reported)


   Entered as Reported by: SELENA ROMO on 10/20/15 1242


   Last Action: Reviewed


Lisinopril (Lisinopril) 5 Mg Tablet, 5 MG PO DAILY, (Reported)


   Entered as Reported by: SELENA ROMO on 10/20/15 1242


   Last Action: Reviewed


Magnesium Oxide (Magnesium) 400 Mg Capsule, 400 MG PO BID, (Reported)


   Entered as Reported by: SELENA ROMO on 10/20/15 1248


   Last Action: Reviewed


Omega3/Dha/Epa/Fish Oil/Vit D3 (Fish Oil + Vitamin D-3 Softgel) 1 Each Capsule, 

1 EACH PO DAILY, (Reported)


   Entered as Reported by: SELENA ROMO on 10/20/15 1242


   Last Action: Reviewed


Pantoprazole Sodium (Protonix) 40 Mg Granpkt., 40 MG PO BID


   Prescribed by: ANNIKA SUNSHINE on 12/29/15 1114


Potassium Gluconate (Potassium Gluconate) 500 Mg Tablet, 500 MG PO DAILY, 

(Reported)


   Entered as Reported by: SELENA ROMO on 10/20/15 1242


   Last Action: Reviewed


Simvastatin (Simvastatin) 20 Mg Tablet, 20 MG PO DAILY, (Reported)


   Entered as Reported by: SELENA BUSTOSCARMENMS on 10/20/15 1242


   Last Action: Reviewed


Venlafaxine HCl (Effexor Xr) 150 Mg Cap.er.24h, 150 MG PO DAILY, (Reported)


   Entered as Reported by: SELENA BUSTOSKATIUSKA on 10/20/15 1242


   Last Action: Reviewed


[Mucinex]  , 2 AS NEEDED, (Reported)


   Entered as Reported by: SELENA BUSTOSKATIUSKA on 10/20/15 1242


   Last Action: Reviewed


Discontinued Medications


Albuterol Sulfate (Proair Hfa) 8.5 Gm Hfa.aer.ad, 8.5 GM IH AS NEEDED, 

(Reported)


   Discontinued Reason: No Longer Taking


   Entered as Reported by: SELENA BUSTOSCARMENMS on 10/20/15 1242


   Last Action: Discontinued





Past Medical-Social-Family Hx


Reviewed Nursing Assessment


Reviewed/Agree w Nursing PMH:  Yes





Family Medical History


Significant Family History:  Heart Disease (father), Cancer (brother pancreatic)





Review of Systems-General


Constitutional:  No chills, No fever


EENTM:  No hearing loss, No blurred vision


Respiratory:  No cough, No dyspnea on exertion, No short of breath


Cardiovascular:  No chest pain, No palpitations


Gastrointestinal:  No abdominal pain; diarrhea; No nausea, No vomiting; other 

(discomfort,  bright red blood per rectum)


Genitourinary:  No dysuria; frequency


Musculoskeletal:  back pain, joint pain (knee needs replaced), neck pain


Skin:  No lesions, No rash


Psychiatric/Neurological:  Anxiety; Denies Depressed, Denies Emotional Problems,

 Denies Headache


All Other Systems Reviewed


Negative Unless Noted:  Yes (Negative excepted noted.)





Physical Exam-General Problems


Physical Exam


General Appearance:  WD/WN, no apparent distress (sitting in chair.)


HEENT:  PERRL/EOMI, normal ENT inspection


Neck:  supple, normal inspection


Respiratory:  chest non-tender, no respiratory distress, no accessory muscle use


Cardiovascular:  regular rate, rhythm, no JVD


Gastrointestinal:  soft, no organomegaly, tenderness (lower quadrants, 

epigastric)


Rectal:  deferred


Back:  normal inspection, no CVA tenderness


Extremities:  non-tender, normal inspection, no pedal edema


Neurologic/Psychiatric:  alert, normal mood/affect, oriented x 3


Skin:  normal color, warm/dry


Lymphatic:  no adenopathy





Assessment/Plan


GI bleeding


non-insulin dependent DM


asthma


arthritis


diverticulosis, rectosigmoid thickening on CT


chronic knee and back pain





Continue Cipro and metronidazole


Continue CLD


Continue SS insulin


Hgb stable


Monitor Labs and Vitals


Consider EGD/colonoscopy if bleeding continues or do as outpatient.





Supervisory-Addendum Brief


Verification & Attestation


Participated in pt care:  history, MDM, physical


Personally performed:  exam, history, MDM, supervision of care


Care discussed with:  Medical Student


Procedures:  n/a


Results interpretation:  Verified all documentation


Verification and Attestation of Medical Student E/M Service





A medical student performed and documented this service in my presence. I 

reviewed and verified all information documented by the medical student and made

 modifications to such information, when appropriate. I personally performed the

 physical exam and medical decision making. 





 Annika Sunshine, Feb 26, 2022,13:41











DALTON COOPER                    Feb 26, 2022 08:17


ANNIKA SUNSHINE DO              Feb 26, 2022 13:37

## 2022-02-27 VITALS — DIASTOLIC BLOOD PRESSURE: 62 MMHG | SYSTOLIC BLOOD PRESSURE: 120 MMHG

## 2022-02-27 VITALS — DIASTOLIC BLOOD PRESSURE: 72 MMHG | SYSTOLIC BLOOD PRESSURE: 113 MMHG

## 2022-02-27 VITALS — SYSTOLIC BLOOD PRESSURE: 112 MMHG | DIASTOLIC BLOOD PRESSURE: 59 MMHG

## 2022-02-27 VITALS — SYSTOLIC BLOOD PRESSURE: 113 MMHG | DIASTOLIC BLOOD PRESSURE: 54 MMHG

## 2022-02-27 VITALS — DIASTOLIC BLOOD PRESSURE: 72 MMHG | SYSTOLIC BLOOD PRESSURE: 121 MMHG

## 2022-02-27 VITALS — SYSTOLIC BLOOD PRESSURE: 115 MMHG | DIASTOLIC BLOOD PRESSURE: 69 MMHG

## 2022-02-27 LAB
ALBUMIN SERPL-MCNC: 3.2 GM/DL (ref 3.2–4.5)
ALP SERPL-CCNC: 68 U/L (ref 40–136)
ALT SERPL-CCNC: 10 U/L (ref 0–55)
BASOPHILS # BLD AUTO: 0 10^3/UL (ref 0–0.1)
BASOPHILS NFR BLD AUTO: 1 % (ref 0–10)
BILIRUB SERPL-MCNC: 0.2 MG/DL (ref 0.1–1)
BUN/CREAT SERPL: 18
CALCIUM SERPL-MCNC: 8 MG/DL (ref 8.5–10.1)
CHLORIDE SERPL-SCNC: 112 MMOL/L (ref 98–107)
CO2 SERPL-SCNC: 21 MMOL/L (ref 21–32)
CREAT SERPL-MCNC: 0.57 MG/DL (ref 0.6–1.3)
EOSINOPHIL # BLD AUTO: 0.2 10^3/UL (ref 0–0.3)
EOSINOPHIL NFR BLD AUTO: 3 % (ref 0–10)
GFR SERPLBLD BASED ON 1.73 SQ M-ARVRAT: 97 ML/MIN
GLUCOSE SERPL-MCNC: 119 MG/DL (ref 70–105)
HCT VFR BLD CALC: 24 % (ref 35–52)
HGB BLD-MCNC: 7.5 G/DL (ref 11.5–16)
LYMPHOCYTES # BLD AUTO: 2 10^3/UL (ref 1–4)
LYMPHOCYTES NFR BLD AUTO: 37 % (ref 12–44)
MANUAL DIFFERENTIAL PERFORMED BLD QL: NO
MCH RBC QN AUTO: 27 PG (ref 25–34)
MCHC RBC AUTO-ENTMCNC: 31 G/DL (ref 32–36)
MCV RBC AUTO: 86 FL (ref 80–99)
MONOCYTES # BLD AUTO: 0.4 10^3/UL (ref 0–1)
MONOCYTES NFR BLD AUTO: 8 % (ref 0–12)
NEUTROPHILS # BLD AUTO: 2.8 10^3/UL (ref 1.8–7.8)
NEUTROPHILS NFR BLD AUTO: 51 % (ref 42–75)
PLATELET # BLD: 222 10^3/UL (ref 130–400)
PMV BLD AUTO: 10.1 FL (ref 9–12.2)
POTASSIUM SERPL-SCNC: 4 MMOL/L (ref 3.6–5)
PROT SERPL-MCNC: 5.4 GM/DL (ref 6.4–8.2)
SODIUM SERPL-SCNC: 141 MMOL/L (ref 135–145)
WBC # BLD AUTO: 5.5 10^3/UL (ref 4.3–11)

## 2022-02-27 RX ADMIN — INSULIN ASPART SCH UNIT: 100 INJECTION, SOLUTION INTRAVENOUS; SUBCUTANEOUS at 16:22

## 2022-02-27 RX ADMIN — SENNOSIDES SCH MG: 8.6 TABLET, FILM COATED ORAL at 19:50

## 2022-02-27 RX ADMIN — DOCUSATE SODIUM SCH MG: 100 CAPSULE ORAL at 19:50

## 2022-02-27 RX ADMIN — SENNOSIDES SCH MG: 8.6 TABLET, FILM COATED ORAL at 09:24

## 2022-02-27 RX ADMIN — INSULIN ASPART SCH UNIT: 100 INJECTION, SOLUTION INTRAVENOUS; SUBCUTANEOUS at 11:12

## 2022-02-27 RX ADMIN — SODIUM CHLORIDE SCH MLS/HR: 900 INJECTION, SOLUTION INTRAVENOUS at 21:07

## 2022-02-27 RX ADMIN — INSULIN ASPART SCH UNIT: 100 INJECTION, SOLUTION INTRAVENOUS; SUBCUTANEOUS at 05:17

## 2022-02-27 RX ADMIN — INSULIN ASPART SCH UNIT: 100 INJECTION, SOLUTION INTRAVENOUS; SUBCUTANEOUS at 21:06

## 2022-02-27 RX ADMIN — CIPROFLOXACIN SCH MLS/HR: 2 INJECTION, SOLUTION INTRAVENOUS at 19:50

## 2022-02-27 RX ADMIN — SODIUM CHLORIDE SCH MLS/HR: 900 INJECTION, SOLUTION INTRAVENOUS at 05:50

## 2022-02-27 RX ADMIN — SODIUM CHLORIDE SCH MLS/HR: 900 INJECTION, SOLUTION INTRAVENOUS at 11:12

## 2022-02-27 RX ADMIN — PANTOPRAZOLE SODIUM SCH MG: 40 TABLET, DELAYED RELEASE ORAL at 09:24

## 2022-02-27 RX ADMIN — CIPROFLOXACIN SCH MLS/HR: 2 INJECTION, SOLUTION INTRAVENOUS at 09:24

## 2022-02-27 RX ADMIN — METRONIDAZOLE SCH MLS/HR: 5 INJECTION, SOLUTION INTRAVENOUS at 21:06

## 2022-02-27 RX ADMIN — METRONIDAZOLE SCH MLS/HR: 5 INJECTION, SOLUTION INTRAVENOUS at 09:24

## 2022-02-27 RX ADMIN — DOCUSATE SODIUM SCH MG: 100 CAPSULE ORAL at 09:24

## 2022-02-27 NOTE — PROGRESS NOTE - HOSPITALIST
Subjective


HPI/CC On Admission


Date Seen by Provider:  Feb 27, 2022


Time Seen by Provider:  11:45


Chief complaint: Hematochezia





History of present illness: This is a 71-year-old white female clinic patient of

[facility] who presented to the ER after feeling dizzy and passing bright red blood per

rectum.  Patient was assessed to have hemoglobin of 10 and CT scan showed 

colitis.  Currently hemoglobin is 8.3.  She will need colonoscopy as an 

outpatient.  IV antibiotics maintained treatment for colitis.  Patient has not 

passed another stool today.


Subjective/Events-last exam


Patient doing a lot better 


Hemoglobin 7.5


No bloody stools


Tolerating soft diet


Scopes indicated





Review of Systems


General:  Fatigue, Malaise





Objective


Exam


Vital Signs





Vital Signs








  Date Time  Temp Pulse Resp B/P (MAP) Pulse Ox O2 Delivery O2 Flow Rate FiO2


 


2/27/22 23:04 36.0 67 18 120/62 (81) 97 Room Air  





Capillary Refill : Less Than 3 Seconds


General Appearance:  No Apparent Distress, WD/WN, Chronically ill


Respiratory:  Lungs Clear, Normal Breath Sounds


Cardiovascular:  Regular Rate, Rhythm


Neurologic/Psychiatric:  Alert, Oriented x3, No Motor/Sensory Deficits, Normal 

Mood/Affect





Results/Procedures


Lab


Patient resulted labs reviewed.





Assessment/Plan


Assessment and Plan


Assess & Plan/Chief Complaint


Assessment:


Hematochezia


Acute blood loss anemia hemoglobin 8.3 today


Symptomatic anemia


Acute colitis





Plan:


Monitor hemoglobin


Protonix


Dr. Arango consult


IV antibiotics empirically





2/27/2022:


Monitor hemoglobin


IV antibiotics





Diagnosis/Problems


Diagnosis/Problems





(1) GI bleed


Status:  Acute


(2) Diverticulitis





Clinical Quality Measures


DVT/VTE Risk/Contraindication:


Contraindications-Pharm:  Other *list below*


Other:  


KARLOS Henry DO                Feb 27, 2022 08:26

## 2022-02-27 NOTE — PROGRESS NOTE - SURGERY
DALTON COOPER 2/27/22 0755:


Subjective


Date Seen by a Provider:  Feb 27, 2022


Time Seen by a Provider:  07:30


Subjective/Events-last exam


Pt doing well. Tolerating Dys1 diet. Ambulated yesterday with PT. Has been 

passing gas but no bm. No blood noted when wiping. No abdominal pain present. 

Still slightly tender in LLQ. She did feel as though her heart was racing at 

certain points last night. Denies CP, N/V, SOB, fever, and chills at this time.


Review of Systems


General:  No Chills, No Malaise


HEENT:  Head Aches (sinus HA on left side); No Visual Changes


Pulmonary:  No Dyspnea, No Cough


Cardiovascular:  Other ("heart racing" sensation last night); No: Chest Pain


Gastrointestinal:  No: Nausea, Vomiting, Abdominal Pain


Genitourinary:  No Dysuria; Frequency


Musculoskeletal:  other (knee pain where cartilage has worn down), neck pain 

(muscular pain left lateral neck)


Neurological:  No: Weakness, Change in speech





Focused Exam


Respiratory:  Lungs Clear, Normal Breath Sounds, No Respiratory Distress


Cardiovascular:  Regular Rate, Rhythm, No Murmur, Normal Peripheral Pulses


Peripheral Pulses:  2+ Radial Pulses (R), 2+ Radial Pulses (L)


Skin:  normal color, warm/dry





Objective


Exam





Vital Signs








  Date Time  Temp Pulse Resp B/P (MAP) Pulse Ox O2 Delivery O2 Flow Rate FiO2


 


2/27/22 07:46     95 Room Air  


 


2/27/22 04:00 36.8 93 19 112/59 (76) 95 Room Air  


 


2/26/22 23:10 37.0 79 20 142/67 (92) 99 Room Air  


 


2/26/22 19:58 37.0 71 19 134/67 (89) 99 Room Air  


 


2/26/22 19:50      Room Air  


 


2/26/22 16:10 37.0 90 22 130/75 (93) 99 Room Air  


 


2/26/22 12:05 36.7 63 18 139/65 (89) 94 Room Air  


 


2/26/22 08:00     96 Room Air  


 


2/26/22 07:56 36.8 78 18 107/57 (74) 96 Room Air  














I & O 


 


 2/27/22





 07:00


 


Intake Total 3075 ml


 


Output Total 6400 ml


 


Balance -3325 ml





Capillary Refill : Less Than 3 Seconds


General Appearance:  No Apparent Distress, WD/WN


Neck:  Normal Inspection, Tender Lateral


Respiratory:  Chest Non Tender, Lungs Clear, Normal Breath Sounds, No Accessory 

Muscle Use, No Respiratory Distress


Cardiovascular:  Regular Rate, Rhythm, No Murmur, Normal Peripheral Pulses


Peripheral Pulses:  2+ Dorsalis Pedis (R), 2+ Left Dors-Pedis (L), 2+ Radial 

Pulses (R), 2+ Radial Pulses (L)


Gastrointestinal:  soft, no organomegaly, tenderness (LLQ)


Extremity:  Normal Inspection, No Pedal Edema


Neurologic/Psychiatric:  Alert, Oriented x3, Normal Mood/Affect


Skin:  Normal Color, Warm/Dry





Results


Lab


Laboratory Tests


2/26/22 10:18: Glucometer 178H


2/26/22 16:15: Glucometer 126H


2/26/22 20:14: Glucometer 146H


2/27/22 05:11: Glucometer 115H


2/27/22 05:34: 


White Blood Count 5.5, Red Blood Count 2.80L, Hemoglobin 7.5L, Hematocrit 24L, 

Mean Corpuscular Volume 86, Mean Corpuscular Hemoglobin 27, Mean Corpuscular 

Hemoglobin Concent 31L, Red Cell Distribution Width 17.2H, Platelet Count 222, 

Mean Platelet Volume 10.1, Immature Granulocyte % (Auto) 0, Neutrophils (%) 

(Auto) 51, Lymphocytes (%) (Auto) 37, Monocytes (%) (Auto) 8, Eosinophils (%) 

(Auto) 3, Basophils (%) (Auto) 1, Neutrophils # (Auto) 2.8, Lymphocytes # (Auto)

2.0, Monocytes # (Auto) 0.4, Eosinophils # (Auto) 0.2, Basophils # (Auto) 0.0, 

Immature Granulocyte # (Auto) 0.0, Sodium Level 141, Potassium Level 4.0, 

Chloride Level 112H, Carbon Dioxide Level 21, Anion Gap 8, Blood Urea Nitrogen 

10, Creatinine 0.57L, Estimat Glomerular Filtration Rate 97, BUN/Creatinine 

Ratio 18, Glucose Level 119H, Calcium Level 8.0L, Corrected Calcium 8.6, Total 

Bilirubin 0.2, Aspartate Amino Transf (AST/SGOT) 15, Alanine Aminotransferase 

(ALT/SGPT) 10, Alkaline Phosphatase 68, Total Protein 5.4L, Albumin 3.2





Assessment/Plan


GI bleeding


non-insulin dependent DM (119 this a.m.)


asthma


arthritis


diverticulosis, rectosigmoid thickening on CT


chronic knee and back pain





Continue Cipro and metronidazole


Tolerating Dys 2 diet


Continue SS insulin


Hgb dec 7.5 from 8.3


Monitor Labs and Vitals


Consider EGD/colonoscopy as outpatient


Consider outpatient cardiology referral based on FH, racing heart sensation





Clinical Quality Measures


DVT/VTE Risk/Contraindication:


Contraindications-Pharm:  Other *list below*


Other:  


ANNIKA Mederos DO 2/27/22 1154:


Subjective


Subjective/Events-last exam


Paitent tolerating diet. No more bloody bm. Passing flatus. No abdominal pain. 

Has minimal discomfort llq. Denies n/v fever sweats chills shortness of breath 

or chest pain.





Objective


Exam


General Appearance:  No Apparent Distress, WD/WN


HEENT:  Normal ENT Inspection


Neck:  Normal Inspection


Respiratory:  Chest Non Tender, No Accessory Muscle Use, No Respiratory Distress


Cardiovascular:  No JVD


Gastrointestinal:  soft, tenderness (LLQ resolved)


Extremity:  Normal Inspection


Neurologic/Psychiatric:  Alert, Oriented x3, Normal Mood/Affect


Skin:  Normal Color, Warm/Dry





Assessment/Plan


GI bleeding


non-insulin dependent DM (119 this a.m.)


asthma


arthritis


diverticulosis, rectosigmoid thickening on CT


chronic knee and back pain





Continue Cipro and metronidazole


Tolerating Dys 2 diet


Continue SS insulin


Hgb dec 7.5 from 8.3


Monitor Labs and Vitals


Consider colonoscopy as outpatient vs inpatient if hgb remains stable- 

outpatient








Supervisory-Addendum Brief


Verification & Attestation


Participated in pt care:  history, MDM, physical


Personally performed:  exam, history, MDM, supervision of care


Care discussed with:  Medical Student


Procedures:  n/a


Results interpretation:  Verified all documentation


Verification and Attestation of Medical Student E/M Service





A medical student performed and documented this service in my presence. I 

reviewed and verified all information documented by the medical student and made

modifications to such information, when appropriate. I personally performed the 

physical exam and medical decision making. 





 Annika Arango, Feb 27, 2022,11:54











DALTON COOPER                    Feb 27, 2022 07:55


ANNIKA ARANGO DO              Feb 27, 2022 11:54

## 2022-02-28 VITALS — DIASTOLIC BLOOD PRESSURE: 65 MMHG | SYSTOLIC BLOOD PRESSURE: 148 MMHG

## 2022-02-28 VITALS — SYSTOLIC BLOOD PRESSURE: 121 MMHG | DIASTOLIC BLOOD PRESSURE: 57 MMHG

## 2022-02-28 LAB
ALBUMIN SERPL-MCNC: 3.3 GM/DL (ref 3.2–4.5)
ALP SERPL-CCNC: 64 U/L (ref 40–136)
ALT SERPL-CCNC: 10 U/L (ref 0–55)
BASOPHILS # BLD AUTO: 0 10^3/UL (ref 0–0.1)
BASOPHILS NFR BLD AUTO: 1 % (ref 0–10)
BILIRUB SERPL-MCNC: 0.1 MG/DL (ref 0.1–1)
BUN/CREAT SERPL: 18
CALCIUM SERPL-MCNC: 8.2 MG/DL (ref 8.5–10.1)
CHLORIDE SERPL-SCNC: 113 MMOL/L (ref 98–107)
CO2 SERPL-SCNC: 20 MMOL/L (ref 21–32)
CREAT SERPL-MCNC: 0.57 MG/DL (ref 0.6–1.3)
EOSINOPHIL # BLD AUTO: 0.2 10^3/UL (ref 0–0.3)
EOSINOPHIL NFR BLD AUTO: 4 % (ref 0–10)
GFR SERPLBLD BASED ON 1.73 SQ M-ARVRAT: 97 ML/MIN
GLUCOSE SERPL-MCNC: 117 MG/DL (ref 70–105)
HCT VFR BLD CALC: 25 % (ref 35–52)
HCT VFR BLD CALC: 25 % (ref 35–52)
HGB BLD-MCNC: 7.6 G/DL (ref 11.5–16)
HGB BLD-MCNC: 7.6 G/DL (ref 11.5–16)
LYMPHOCYTES # BLD AUTO: 2 10^3/UL (ref 1–4)
LYMPHOCYTES NFR BLD AUTO: 38 % (ref 12–44)
MANUAL DIFFERENTIAL PERFORMED BLD QL: NO
MCH RBC QN AUTO: 26 PG (ref 25–34)
MCHC RBC AUTO-ENTMCNC: 30 G/DL (ref 32–36)
MCV RBC AUTO: 86 FL (ref 80–99)
MONOCYTES # BLD AUTO: 0.4 10^3/UL (ref 0–1)
MONOCYTES NFR BLD AUTO: 8 % (ref 0–12)
NEUTROPHILS # BLD AUTO: 2.7 10^3/UL (ref 1.8–7.8)
NEUTROPHILS NFR BLD AUTO: 49 % (ref 42–75)
PLATELET # BLD: 222 10^3/UL (ref 130–400)
PMV BLD AUTO: 10.8 FL (ref 9–12.2)
POTASSIUM SERPL-SCNC: 4 MMOL/L (ref 3.6–5)
PROT SERPL-MCNC: 5.6 GM/DL (ref 6.4–8.2)
SODIUM SERPL-SCNC: 142 MMOL/L (ref 135–145)
WBC # BLD AUTO: 5.4 10^3/UL (ref 4.3–11)

## 2022-02-28 RX ADMIN — INSULIN ASPART SCH UNIT: 100 INJECTION, SOLUTION INTRAVENOUS; SUBCUTANEOUS at 12:11

## 2022-02-28 RX ADMIN — SENNOSIDES SCH MG: 8.6 TABLET, FILM COATED ORAL at 09:10

## 2022-02-28 RX ADMIN — DOCUSATE SODIUM SCH MG: 100 CAPSULE ORAL at 09:10

## 2022-02-28 RX ADMIN — INSULIN ASPART SCH UNIT: 100 INJECTION, SOLUTION INTRAVENOUS; SUBCUTANEOUS at 09:07

## 2022-02-28 RX ADMIN — PANTOPRAZOLE SODIUM SCH MG: 40 TABLET, DELAYED RELEASE ORAL at 09:10

## 2022-02-28 RX ADMIN — INSULIN ASPART SCH UNIT: 100 INJECTION, SOLUTION INTRAVENOUS; SUBCUTANEOUS at 16:02

## 2022-02-28 RX ADMIN — METRONIDAZOLE SCH MLS/HR: 5 INJECTION, SOLUTION INTRAVENOUS at 09:11

## 2022-02-28 RX ADMIN — CIPROFLOXACIN SCH MLS/HR: 2 INJECTION, SOLUTION INTRAVENOUS at 09:10

## 2022-02-28 RX ADMIN — SODIUM CHLORIDE SCH MLS/HR: 900 INJECTION, SOLUTION INTRAVENOUS at 17:24

## 2022-02-28 RX ADMIN — SODIUM CHLORIDE SCH MLS/HR: 900 INJECTION, SOLUTION INTRAVENOUS at 05:51

## 2022-02-28 NOTE — PHYSICAL THERAPY DAILY NOTE
PT Daily Note-Current


Subjective


Patient presented laying in bed and reported she wanted to go for a walk.





Mental Status


Patient Orientation:  Person, Situation


Attachments:  IV





Transfers


SCALE: Activities may be completed with or without assistive devices.





6-Indepedent-patient completes the activity by him/herself with no assistance 

from a helper.


5-Set-up or Clean-up Assistance-helper sets up or cleans up; patient completes 

activity. [place] assists only prior to or  


    following the activity.


4-Supervision or Touching Assistance-helper provides verbal cues and/or 

touching/steadying and/or contact guard assistance as patient completes a

ctivity. Assistance may be provided   


    throughout the activity or intermittently.


3-Partial/Moderate Assistance-helper does LESS THAN HALF the effort. [place] 

lifts, holds or supports trunk or limbs, but provides less than half the effort.


2-Substantial/Maximal Assistance-helper does MORE THAN HALF the effort. [place] 

lifts or holds trunk or limbs and provides more than half the effort.


5-Mieiakzzh-omwnnt does ALL the effort. Patient does none of the effort to 

complete the activity. Or, the assistance of 2 or more helpers is required for 

the patient to complete the  


    activity.


If activity was not attempted, code reason:


7-Patient Refused.


9-Not Applicable-not attempted and the patient did not perform the activity 

before the current illness, exacerbation or injury.


10-Not Attempted due to Environmental Limitations-(lack of equipment, weather 

restraints, etc.).


88-Not Attempted due to Medical Conditions or Safety Concerns.


Lying to Sitting/Side of Bed(Q:  6


Sit to Stand (QC):  6


Chair/Bed-to-Chair Xfer(QC):  6


Patient is independent for all transfers.





Weight Bearing


Right Lower Extremity:  Right


Full Weight Bearing


Left Lower Extremity:  Left


Full Weight Bearing





Gait Training


Does the Patient Walk?:  Yes


Distance:  500'


Walk 10 feet (QC):  6


Walk 50 ft with 2 Turns(QC):  6


Walk 150 ft (QC):  6


Gait Assistive Device:  FWW


Patient ambulated 500' with FWW independently.





Assessment


Patient ambulated and performed bed mobility during therapy session. Patient 

performed all activities independently and ambulated 500' with minimal fatigue. 

Patient is being d/c from therapy due to independence with ambulation, bed 

mobility, and transfers.





PT Long Term Goals


Long Term Goals


PT Long Term Goals Time Frame:  Mar 5, 2022


Roll Left & Right (QC):  6


Sit to Lying (QC):  6


Lying-Sitting on Side/Bed(QC):  6


Sit to Stand (QC):  6


Chair/Bed-to-Chair Xfer(QC):  6


Toilet Transfer (QC):  6


Walk 10 feet (QC):  6


Walk 50ft with 2 Turns (QC):  6


Walk 150 ft (QC):  6





PT Plan


Treatment/Plan


Treatment Plan:  Continue Plan of Care, Discontinue PT, goals met


Treatment Plan:  Education, Functional Activity Bl[place], Functional Strength, 

Gait, Safety, Therapeutic Exercise, Transfers


Treatment Duration:  Mar 5, 2022


Frequency:  6 times per week


Estimated Hrs Per Day:  .25 hour per day


Patient and/or Family Agrees t:  Yes





Time/GCodes


Time In:  822


Time Out:  833


Total Billed Treatment Time:  11


Total Billed Treatment


1 Visit


FA 11 min











ROLAND OJEDA PT              Feb 28, 2022 09:13

## 2022-02-28 NOTE — PROGRESS NOTE
Subjective


Subjective/Events-last exam


Afebrile, feeling okay. Denies any bowel movement still.





Objective


Exam


Last Set of Vital Signs





Vital Signs








  Date Time  Temp Pulse Resp B/P (MAP) Pulse Ox O2 Delivery O2 Flow Rate FiO2


 


2/28/22 08:10 36.9 70 20 121/57 (78) 94 Room Air  





Capillary Refill : Less Than 3 Seconds


I&O











Intake and Output 


 


 2/28/22





 00:00


 


Intake Total 2275 ml


 


Output Total 6750 ml


 


Balance -4475 ml


 


 


 


Intake Oral 2275 ml


 


Output Urine Total 6750 ml








General:  Alert, No Acute Distress


Lungs:  Clear to Auscultation, Normal Air Movement


Heart:  Regular Rate, No Murmurs


Abdomen:  Normal Bowel Sounds, Soft, Other (mild periumbilical ttp)


Extremities:  No Edema


Neuro:  Normal Speech


Psych/Mental Status:  Mental Status NL, Mood NL





Results/Procedures


Lab


Laboratory Tests


2/27/22 16:18: Glucometer 94


2/28/22 05:42: 


White Blood Count 5.4, Red Blood Count 2.91L, Hemoglobin 7.6L, Hematocrit 25L, 

Mean Corpuscular Volume 86, Mean Corpuscular Hemoglobin 26, Mean Corpuscular 

Hemoglobin Concent 30L, Red Cell Distribution Width 17.1H, Platelet Count 222, 

Mean Platelet Volume 10.8, Immature Granulocyte % (Auto) 1, Neutrophils (%) 

(Auto) 49, Lymphocytes (%) (Auto) 38, Monocytes (%) (Auto) 8, Eosinophils (%) 

(Auto) 4, Basophils (%) (Auto) 1, Neutrophils # (Auto) 2.7, Lymphocytes # (Auto)

2.0, Monocytes # (Auto) 0.4, Eosinophils # (Auto) 0.2, Basophils # (Auto) 0.0, 

Immature Granulocyte # (Auto) 0.0, Sodium Level 142, Potassium Level 4.0, 

Chloride Level 113H, Carbon Dioxide Level 20L, Anion Gap 9, Blood Urea Nitrogen 

10, Creatinine 0.57L, Estimat Glomerular Filtration Rate 97, BUN/Creatinine 

Ratio 18, Glucose Level 117H, Calcium Level 8.2L, Corrected Calcium 8.8, Total 

Bilirubin 0.1, Aspartate Amino Transf (AST/SGOT) 16, Alanine Aminotransferase 

(ALT/SGPT) 10, Alkaline Phosphatase 64, Total Protein 5.6L, Albumin 3.3


2/28/22 06:36: Glucometer 117H





Assessment/Plan


Assessment/Plan





(1) GI bleed


Status:  Acute


Assessment & Plan:  Hemoglobin similar to yesterday but overall with downward 

trend, 7.6 today. No stool since arrival. If hemoglobin stable tomorrow, 

anticipate d/c, if dropping, possible scopes prior to d/c.


Qualifiers:  


   Qualified Codes:  K92.2 - Gastrointestinal hemorrhage, unspecified


(2) Colon wall thickening


Status:  Acute


Assessment & Plan:  Rectosigmoid thickening, need for colonoscopy, but unclear 

whether needed urgently given she has stopped having blood stool. Started on 

cipro/metronidazole on admit.





(3) Diabetes


Status:  Chronic


Assessment & Plan:  Hold home metformin/sitagliptan, blood sugar currently 

appropriate.


Qualifiers:  


   Qualified Codes:  E11.69 - Type 2 diabetes mellitus with other specified 

complication


(4) Depression


Status:  Chronic


Assessment & Plan:  Hold home venlafaxine for now, given possible increased GI 

bleed risk.





(5) Anxiety


Status:  Chronic


Assessment & Plan:  Home buspirone





(6) Hyperlipidemia


Status:  Chronic


Assessment & Plan:  Home statin and fish oil





(7) DVT prophylaxis


Status:  Acute


Assessment & Plan:  No pharmacologic due to GI bleeding.








Clinical Quality Measures


DVT/VTE Risk/Contraindication:


Contraindications-Pharm:  Other *list below*


Other:  


ADA Howell MD             Feb 28, 2022 13:59

## 2022-02-28 NOTE — PROGRESS NOTE - SURGERY
DALTON COOPER 2/28/22 0659:


Subjective


Date Seen by a Provider:  Feb 28, 2022


Time Seen by a Provider:  06:40


Subjective/Events-last exam


Pt reports feeling ok this morning. Tolerating dys 1 diet. Has not had a bowel 

movement despite taking stool softeners, but is passing gas. Reports no pain but

feels gassy discomfort in her abdomen, especially on her lower left side. She 

has no N/V. Denies CP, SOB, palpitations, at this time. No fever.


Review of Systems


General:  No Chills; Fatigue


HEENT:  Head Aches (left sinus); No Visual Changes


Pulmonary:  No Dyspnea, No Cough


Cardiovascular:  No: Chest Pain, Palpitations


Gastrointestinal:  Abdominal Pain (feels "gassy" discomfort LLQ); No: Nausea, 

Vomiting


Genitourinary:  No Dysuria; Frequency


Musculoskeletal:  No: neck pain, leg pain


Neurological:  No: Weakness, Change in speech





Focused Exam


Respiratory:  Lungs Clear, Normal Breath Sounds, No Respiratory Distress


Cardiovascular:  Regular Rate, Rhythm, No Murmur


Peripheral Pulses:  2+ Radial Pulses (R), 2+ Radial Pulses (L)


Skin:  normal color, warm/dry





Objective


Exam





Vital Signs








  Date Time  Temp Pulse Resp B/P (MAP) Pulse Ox O2 Delivery O2 Flow Rate FiO2


 


2/27/22 23:04 36.0 67 18 120/62 (81) 97 Room Air  


 


2/27/22 19:55      Room Air  


 


2/27/22 19:34 36.3 77 20 121/72 (88) 100 Room Air  


 


2/27/22 16:07 36.0 69 20 113/54 (73) 98 Room Air  


 


2/27/22 11:54 36.0 74 18 113/72 (86) 98 Room Air  


 


2/27/22 08:01 36.5 72 18 115/69 (84) 96 Room Air  


 


2/27/22 07:46     95 Room Air  











l


I & O 


 


 2/28/22





 06:59


 


Intake Total 2125 ml


 


Output Total 6450 ml


 


Balance -4325 ml





Capillary Refill : Less Than 3 Seconds


General Appearance:  No Apparent Distress, WD/WN


Respiratory:  Lungs Clear, Normal Breath Sounds, No Respiratory Distress


Cardiovascular:  Regular Rate, Rhythm, Normal Peripheral Pulses


Peripheral Pulses:  2+ Radial Pulses (R), 2+ Radial Pulses (L)


Gastrointestinal:  soft, tenderness (LUQ discomfort today)


Extremity:  Normal Inspection, No Pedal Edema


Neurologic/Psychiatric:  Alert, Oriented x3, Normal Mood/Affect


Skin:  Normal Color, Warm/Dry





Results


Lab


Laboratory Tests


2/27/22 10:19: Glucometer 151H


2/27/22 16:18: Glucometer 94


2/28/22 06:36: Glucometer 117H





Assessment/Plan


GI bleeding


non-insulin dependent DM (117 this a.m.)


asthma


arthritis


diverticulosis, rectosigmoid thickening on CT


chronic knee and back pain





Continue Cipro and metronidazole


Tolerating Dys 1 diet


Continue SS insulin


Hgb stable (7.6 today, 7.5 yesterday)


Consider outpatient colonoscopy- Hgb stable








Clinical Quality Measures


DVT/VTE Risk/Contraindication:


Contraindications-Pharm:  Other *list below*


Other:  


ANNIKA Mederos DO 2/28/22 2095:


Subjective


Subjective/Events-last exam


Feeling okay.  Tolerating diet.  Hgb stable today at 7.6. No more blood per 

rectum.  No bm.  No abdominal pain. Denies n/v fever sweats chills shortness of 

breath or chest pain.





Objective


Exam


General Appearance:  No Apparent Distress, WD/WN


HEENT:  PERRL/EOMI, Normal ENT Inspection


Neck:  Full Range of Motion, Non Tender


Respiratory:  Chest Non Tender, No Accessory Muscle Use, No Respiratory Distress


Cardiovascular:  Regular Rate, Rhythm, No JVD


Gastrointestinal:  non tender, soft


Extremity:  Normal Inspection


Neurologic/Psychiatric:  Alert, Oriented x3


Skin:  Normal Color, Warm/Dry


Lymphatic:  No Adenopathy





Assessment/Plan


GI bleeding


non-insulin dependent DM


asthma


arthritis


diverticulosis, rectosigmoid thickening on CT


chronic knee and back pain





Continue Cipro and metronidazole


Tolerating Dys 1 diet


Continue SS insulin


Hgb stable (7.6 today, 7.5 yesterday)


Plan outpatient colonoscopy- Hgb stable, if drop can do inpatient 





Supervisory-Addendum Brief


Verification & Attestation


Participated in pt care:  history, MDM, physical


Personally performed:  exam, history, MDM, supervision of care


Care discussed with:  Medical Student


Procedures:  n/a


Results interpretation:  Verified all documentation


Verification and Attestation of Medical Student E/M Service





A medical student performed and documented this service in my presence. I 

reviewed and verified all information documented by the medical student and made

modifications to such information, when appropriate. I personally performed the 

physical exam and medical decision making. 





 Annika Arango, Feb 28, 2022,17:15











DALTON COOPER                    Feb 28, 2022 06:59


ANNIKA ARANGO DO              Feb 28, 2022 17:15

## 2022-03-31 ENCOUNTER — HOSPITAL ENCOUNTER (OUTPATIENT)
Dept: HOSPITAL 75 - PREOP | Age: 72
LOS: 1 days | Discharge: HOME | End: 2022-04-01
Attending: SURGERY
Payer: MEDICARE

## 2022-03-31 VITALS — HEIGHT: 62.99 IN | WEIGHT: 181.22 LBS | BODY MASS INDEX: 32.11 KG/M2

## 2022-03-31 DIAGNOSIS — Z01.818: Primary | ICD-10-CM

## 2022-04-12 ENCOUNTER — HOSPITAL ENCOUNTER (OUTPATIENT)
Dept: HOSPITAL 75 - ENDO | Age: 72
Discharge: HOME | End: 2022-04-12
Attending: SURGERY
Payer: MEDICARE

## 2022-04-12 VITALS — SYSTOLIC BLOOD PRESSURE: 135 MMHG | DIASTOLIC BLOOD PRESSURE: 79 MMHG

## 2022-04-12 VITALS — HEIGHT: 62.99 IN | WEIGHT: 181.22 LBS | BODY MASS INDEX: 32.11 KG/M2

## 2022-04-12 VITALS — DIASTOLIC BLOOD PRESSURE: 55 MMHG | SYSTOLIC BLOOD PRESSURE: 111 MMHG

## 2022-04-12 VITALS — DIASTOLIC BLOOD PRESSURE: 68 MMHG | SYSTOLIC BLOOD PRESSURE: 122 MMHG

## 2022-04-12 VITALS — DIASTOLIC BLOOD PRESSURE: 56 MMHG | SYSTOLIC BLOOD PRESSURE: 114 MMHG

## 2022-04-12 DIAGNOSIS — Z87.19: ICD-10-CM

## 2022-04-12 DIAGNOSIS — K29.50: Primary | ICD-10-CM

## 2022-04-12 DIAGNOSIS — K63.5: ICD-10-CM

## 2022-04-12 DIAGNOSIS — K31.7: ICD-10-CM

## 2022-04-12 DIAGNOSIS — K21.00: ICD-10-CM

## 2022-04-12 NOTE — DISCHARGE INST-SIMPLE/STANDARD
Discharge Inst-Standard


Discharge Medications


New, Converted or Re-Newed RX:  Transmitted to Pharmacy





Patient Instructions/Follow Up


Plan of Care/Instructions/FU:  


2 Weeks mary


Activity as Tolerated:  Yes


Discharge Diet:  Regular Diet (High fiber)











ANNKIA SUNSHINE DO              Apr 12, 2022 12:40

## 2022-04-12 NOTE — OPERATIVE REPORT
DATE OF SERVICE:  04/12/2022



PREOPERATIVE DIAGNOSES:

Gastroesophageal reflux disease and history of colitis.



POSTOPERATIVE DIAGNOSES:

Diverticulosis, gastric polyps, reflux esophagitis.  Transverse colon polyps x2.



PROCEDURE:

EGD with biopsies, colonoscopy with hot biopsy polypectomy x2.



SURGEON:

Annika Arango DO



ANESTHESIA:

Per CRNA.



ESTIMATED BLOOD LOSS:

None.



COMPLICATIONS:

None.



INDICATIONS:

The patient is a 72-year-old female with history of colitis and GERD symptoms. 

She understands risks and benefits of procedure and wished to proceed.  Consent

was signed in the chart.



DESCRIPTION OF PROCEDURE:

The patient was taken to endoscopy suite, placed in left lateral recumbent

position.  Timeout was performed.  Scope was inserted in mouth, down the

esophagus, stomach and into the duodenum without difficulty.  No polyps, masses

or ulcerations.  Scope was slowly retracted back into the stomach where it was

further insufflated.  Small gastric polyps.  No masses or ulcerations.  Biopsy

of the antrum was obtained.  Scope was retroflexed noting no other pathology. 

Scope was returned to its normal position, slowly withdrawn to distal esophagus.

 Biopsy of the GE junction was obtained.  Some reflux esophagitis.  Scope was

slowly retracted back until completely removed.  The patient tolerated procedure

well without any complications.  Digital rectal exam was performed.  No palpable

polyps, masses or ulcerations.  Scope was inserted in the rectum and advanced

all the way to cecum with minimal difficulty.  Prep was adequate with irrigation

and suction.  Scope was slowly retracted back.  No polyps, masses or ulcerations

in the cecum, ascending and transverse colon, there were two small polyps, which

hot biopsy polypectomies were performed.  Scope was then continuously retracted

back.  No polyps, masses or ulcerations in remainder of the transverse,

descending and sigmoid colon throughout _____ diverticulosis present.  Once in

the rectum, scope was retroflexed noting no other pathology.  Scope was returned

to its normal position, slowly withdrawn until completely removed.  The patient

tolerated procedure well without any complications.  She was taken to recovery

room in stable condition.



RECOMMENDATIONS:

The patient will need repeat colonoscopy on an as-needed basis.  She will follow

up on pathology results.  Further recommendations pending biopsy results.





Job ID: [id number]

DocumentID: [id number]

Dictated Date:  04/12/2022 12:38:29

Transcription Date: 04/12/2022 16:53:05

Dictated By: ANNIKA ARANGO DO

## 2022-04-12 NOTE — PROGRESS NOTE-PRE OPERATIVE
Pre-Operative Progress Note


H&P Reviewed


The H&P was reviewed, patient examined and no changes noted.


Date Seen by Provider:  Apr 12, 2022


Time Seen by Provider:  09:22


Date H&P Reviewed:  Apr 12, 2022


Time H&P Reviewed:  09:22


Pre-Operative Diagnosis:  gerd hx colitis











ANNIKA SUNSHINE DO              Apr 12, 2022 09:22

## 2022-04-12 NOTE — ANESTHESIA-GENERAL POST-OP
MAC


Patient Condition


Mental Status/LOC:  Same as Preop


Cardiovascular:  Satisfactory


Nausea/Vomiting:  Absent


Respiratory:  Satisfactory


Pain:  Controlled


Complications:  Absent





Post Op Complications


Complications


None





Follow Up Care/Instructions


Patient Instructions


None needed.





Anesthesiology Discharge Order


Discharge Order


Patient is doing well, no complaints, stable vital signs, no apparent adverse 

anesthesia problems.   


No complications reported per nursing.











NATALIE OLIVA CRNA           Apr 12, 2022 14:43

## 2022-07-26 ENCOUNTER — HOSPITAL ENCOUNTER (OUTPATIENT)
Dept: HOSPITAL 75 - RAD | Age: 72
End: 2022-07-26
Attending: NURSE PRACTITIONER
Payer: MEDICARE

## 2022-07-26 DIAGNOSIS — M81.0: Primary | ICD-10-CM

## 2022-07-26 DIAGNOSIS — Z78.0: ICD-10-CM

## 2022-07-26 PROCEDURE — 77080 DXA BONE DENSITY AXIAL: CPT

## 2022-07-26 NOTE — DIAGNOSTIC IMAGING REPORT
INDICATION: 

Postmenopausal state.



COMPARISON: 

09/24/2019.



FINDINGS:



AP Spine L2-L4:  

[BMD (g/cm2): 0.903] [T-Score: -2.5] [Z-Score: -1.5]

[BMD Previous: 1.055] [BMD % Change: -14.4]*



LT Hip Neck:       

[BMD (g/cm2): 0.791] [T-Score: -1.8] [Z-Score: -0.5]



LT Hip Total:       

[BMD (g/cm2):0.848] [T-Score:-1.3] [Z-Score: -0.2]

[BMD Previous: 0.967] [BMD % Change: -12.3]



RT Hip Neck:      

[BMD (g/cm2):0.817] [T-Score:-1.6] [Z-Score:-0.3]



RT Hip Total:      

[BMD (g/cm2):0.898] [T-score:-0.9] [Z-Score:0.2]

[BMD Previous:1.060] [BMD % Change:-15.3]*



*Indicates significant change from prior examination based on 95%

confidence level.



World Health Organization criteria for BMD interpretation

classify patients as Normal (T-score at or above -1.0),

Osteopenic (T-score between -1.0 and -2.5) or Osteoporotic

(T-score at or below -2.5).



LIMITATIONS AND MODIFICATION:  

None.



FRACTURE RISK (FRAX SCORE):

The ten year probability of (%): 

Major Osteoporotic Fracture: [13.7]

Hip Fracture: [2.8]



IMPRESSION:

1. Osteoporosis.

2. Bone mineral density has decreased by a statistically

significant amount, as detailed above. 

3. See below National Osteoporosis Foundation guidelines on when

to potentially initiate pharmacologic therapy. 



Based on the National Osteoporosis Foundation Guidelines,

pharmacologic treatment should be initiated in any of the

following, unless clinical conditions suggest otherwise:



*  Any patient with prior fragility fracture of the hip or

vertebrae. A spine fracture indicates 5X risk for subsequent

spine fracture and 2X risk for subsequent hip fracture.



*  Osteoporosis (T-score <-2.5).



*  Postmenopausal women and men age 50 and older with low bone

mass/osteopenia (T-score between -1.0 and -2.5) by DXA and

10-year major osteoporotic fracture greater than 20% or a 10-year

probability of hip fracture greater than 3%. These fracture risks

are supplied above in the FRAX score, if applicable.



*  Clinician judgement and/or patient preferences may indicate

treatment for people with 10-year fracture probabilities above or

below these levels.



Dictated by: 



  Dictated on workstation # [id number]